# Patient Record
Sex: MALE | Race: WHITE | NOT HISPANIC OR LATINO | Employment: OTHER | URBAN - NONMETROPOLITAN AREA
[De-identification: names, ages, dates, MRNs, and addresses within clinical notes are randomized per-mention and may not be internally consistent; named-entity substitution may affect disease eponyms.]

---

## 2017-05-25 ENCOUNTER — APPOINTMENT (OUTPATIENT)
Dept: CT IMAGING | Facility: HOSPITAL | Age: 72
End: 2017-05-25

## 2017-05-25 ENCOUNTER — APPOINTMENT (OUTPATIENT)
Dept: GENERAL RADIOLOGY | Facility: HOSPITAL | Age: 72
End: 2017-05-25

## 2017-05-25 ENCOUNTER — HOSPITAL ENCOUNTER (INPATIENT)
Facility: HOSPITAL | Age: 72
LOS: 1 days | Discharge: HOME | End: 2017-05-27
Attending: EMERGENCY MEDICINE | Admitting: HOSPITALIST

## 2017-05-25 DIAGNOSIS — I25.10 CORONARY ARTERY DISEASE INVOLVING NATIVE CORONARY ARTERY, ANGINA PRESENCE UNSPECIFIED, UNSPECIFIED WHETHER NATIVE OR TRANSPLANTED HEART: ICD-10-CM

## 2017-05-25 DIAGNOSIS — I20.9 ISCHEMIC CHEST PAIN (HCC): Primary | ICD-10-CM

## 2017-05-25 DIAGNOSIS — I10 ESSENTIAL HYPERTENSION: ICD-10-CM

## 2017-05-25 LAB
ALBUMIN SERPL-MCNC: 4.1 G/DL (ref 3.4–4.8)
ALBUMIN/GLOB SERPL: 1.4 G/DL (ref 1.1–1.8)
ALP SERPL-CCNC: 77 U/L (ref 38–126)
ALT SERPL W P-5'-P-CCNC: 49 U/L (ref 21–72)
ANION GAP SERPL CALCULATED.3IONS-SCNC: 9 MMOL/L (ref 5–15)
APTT PPP: 26.1 SECONDS (ref 20–40.3)
AST SERPL-CCNC: 33 U/L (ref 17–59)
BASOPHILS # BLD AUTO: 0.01 10*3/MM3 (ref 0–0.2)
BASOPHILS NFR BLD AUTO: 0.1 % (ref 0–2)
BILIRUB SERPL-MCNC: 1.1 MG/DL (ref 0.2–1.3)
BUN BLD-MCNC: 15 MG/DL (ref 7–21)
BUN/CREAT SERPL: 20.5 (ref 7–25)
CALCIUM SPEC-SCNC: 8.7 MG/DL (ref 8.4–10.2)
CHLORIDE SERPL-SCNC: 102 MMOL/L (ref 95–110)
CK MB SERPL-CCNC: 1.83 NG/ML (ref 0–5)
CK SERPL-CCNC: 29 U/L (ref 55–170)
CO2 SERPL-SCNC: 27 MMOL/L (ref 22–31)
CREAT BLD-MCNC: 0.73 MG/DL (ref 0.7–1.3)
D-DIMER, QUANTITATIVE (MAD,POW, STR): 863 NG/ML (FEU) (ref 0–470)
DEPRECATED RDW RBC AUTO: 40.6 FL (ref 35.1–43.9)
EOSINOPHIL # BLD AUTO: 0.03 10*3/MM3 (ref 0–0.7)
EOSINOPHIL NFR BLD AUTO: 0.4 % (ref 0–7)
ERYTHROCYTE [DISTWIDTH] IN BLOOD BY AUTOMATED COUNT: 12.7 % (ref 11.5–14.5)
GFR SERPL CREATININE-BSD FRML MDRD: 105 ML/MIN/1.73 (ref 60–98)
GLOBULIN UR ELPH-MCNC: 3 GM/DL (ref 2.3–3.5)
GLUCOSE BLD-MCNC: 113 MG/DL (ref 60–100)
HCT VFR BLD AUTO: 45.2 % (ref 39–49)
HGB BLD-MCNC: 15.7 G/DL (ref 13.7–17.3)
HOLD SPECIMEN: NORMAL
HOLD SPECIMEN: NORMAL
IMM GRANULOCYTES # BLD: 0.02 10*3/MM3 (ref 0–0.02)
IMM GRANULOCYTES NFR BLD: 0.3 % (ref 0–0.5)
INR PPP: 0.99 (ref 0.8–1.2)
LARGE PLATELETS: NORMAL
LYMPHOCYTES # BLD AUTO: 0.33 10*3/MM3 (ref 0.6–4.2)
LYMPHOCYTES NFR BLD AUTO: 4.4 % (ref 10–50)
MAGNESIUM SERPL-MCNC: 1.5 MG/DL (ref 1.6–2.3)
MCH RBC QN AUTO: 30.3 PG (ref 26.5–34)
MCHC RBC AUTO-ENTMCNC: 34.7 G/DL (ref 31.5–36.3)
MCV RBC AUTO: 87.1 FL (ref 80–98)
MONOCYTES # BLD AUTO: 0.41 10*3/MM3 (ref 0–0.9)
MONOCYTES NFR BLD AUTO: 5.4 % (ref 0–12)
NEUTROPHILS # BLD AUTO: 6.78 10*3/MM3 (ref 2–8.6)
NEUTROPHILS NFR BLD AUTO: 89.4 % (ref 37–80)
NT-PROBNP SERPL-MCNC: 383 PG/ML (ref 0–900)
PLATELET # BLD AUTO: 92 10*3/MM3 (ref 150–450)
PMV BLD AUTO: 10 FL (ref 8–12)
POTASSIUM BLD-SCNC: 4.3 MMOL/L (ref 3.5–5.1)
PROT SERPL-MCNC: 7.1 G/DL (ref 6.3–8.6)
PROTHROMBIN TIME: 13 SECONDS (ref 11.1–15.3)
RBC # BLD AUTO: 5.19 10*6/MM3 (ref 4.37–5.74)
RBC MORPH BLD: NORMAL
SMALL PLATELETS BLD QL SMEAR: NORMAL
SODIUM BLD-SCNC: 138 MMOL/L (ref 137–145)
TROPONIN I SERPL-MCNC: 0.02 NG/ML
TROPONIN I SERPL-MCNC: <0.012 NG/ML
WBC MORPH BLD: NORMAL
WBC NRBC COR # BLD: 7.58 10*3/MM3 (ref 3.2–9.8)
WHOLE BLOOD HOLD SPECIMEN: NORMAL
WHOLE BLOOD HOLD SPECIMEN: NORMAL

## 2017-05-25 PROCEDURE — 99285 EMERGENCY DEPT VISIT HI MDM: CPT

## 2017-05-25 PROCEDURE — 85007 BL SMEAR W/DIFF WBC COUNT: CPT | Performed by: EMERGENCY MEDICINE

## 2017-05-25 PROCEDURE — G0378 HOSPITAL OBSERVATION PER HR: HCPCS

## 2017-05-25 PROCEDURE — 84484 ASSAY OF TROPONIN QUANT: CPT | Performed by: HOSPITALIST

## 2017-05-25 PROCEDURE — 25010000002 MORPHINE PER 10 MG: Performed by: EMERGENCY MEDICINE

## 2017-05-25 PROCEDURE — 82553 CREATINE MB FRACTION: CPT | Performed by: EMERGENCY MEDICINE

## 2017-05-25 PROCEDURE — 85379 FIBRIN DEGRADATION QUANT: CPT | Performed by: EMERGENCY MEDICINE

## 2017-05-25 PROCEDURE — 83735 ASSAY OF MAGNESIUM: CPT | Performed by: EMERGENCY MEDICINE

## 2017-05-25 PROCEDURE — 85730 THROMBOPLASTIN TIME PARTIAL: CPT | Performed by: EMERGENCY MEDICINE

## 2017-05-25 PROCEDURE — 85025 COMPLETE CBC W/AUTO DIFF WBC: CPT | Performed by: EMERGENCY MEDICINE

## 2017-05-25 PROCEDURE — 0 IOPAMIDOL PER 1 ML: Performed by: EMERGENCY MEDICINE

## 2017-05-25 PROCEDURE — 93005 ELECTROCARDIOGRAM TRACING: CPT | Performed by: EMERGENCY MEDICINE

## 2017-05-25 PROCEDURE — 85610 PROTHROMBIN TIME: CPT | Performed by: EMERGENCY MEDICINE

## 2017-05-25 PROCEDURE — 82550 ASSAY OF CK (CPK): CPT | Performed by: EMERGENCY MEDICINE

## 2017-05-25 PROCEDURE — 71275 CT ANGIOGRAPHY CHEST: CPT

## 2017-05-25 PROCEDURE — 84484 ASSAY OF TROPONIN QUANT: CPT | Performed by: EMERGENCY MEDICINE

## 2017-05-25 PROCEDURE — 25010000002 MORPHINE SULFATE (PF) 2 MG/ML SOLUTION: Performed by: EMERGENCY MEDICINE

## 2017-05-25 PROCEDURE — 80053 COMPREHEN METABOLIC PANEL: CPT | Performed by: EMERGENCY MEDICINE

## 2017-05-25 PROCEDURE — 93010 ELECTROCARDIOGRAM REPORT: CPT | Performed by: INTERNAL MEDICINE

## 2017-05-25 PROCEDURE — 83880 ASSAY OF NATRIURETIC PEPTIDE: CPT | Performed by: EMERGENCY MEDICINE

## 2017-05-25 PROCEDURE — 25010000002 ONDANSETRON PER 1 MG: Performed by: EMERGENCY MEDICINE

## 2017-05-25 PROCEDURE — 71010 HC CHEST PA OR AP: CPT

## 2017-05-25 RX ORDER — NITROGLYCERIN 20 MG/100ML
10-50 INJECTION INTRAVENOUS
Status: DISCONTINUED | OUTPATIENT
Start: 2017-05-25 | End: 2017-05-26 | Stop reason: CLARIF

## 2017-05-25 RX ORDER — MORPHINE SULFATE 2 MG/ML
2 INJECTION, SOLUTION INTRAMUSCULAR; INTRAVENOUS ONCE
Status: COMPLETED | OUTPATIENT
Start: 2017-05-25 | End: 2017-05-25

## 2017-05-25 RX ORDER — SODIUM CHLORIDE 9 MG/ML
75 INJECTION, SOLUTION INTRAVENOUS CONTINUOUS
Status: DISCONTINUED | OUTPATIENT
Start: 2017-05-25 | End: 2017-05-26

## 2017-05-25 RX ORDER — ONDANSETRON 2 MG/ML
4 INJECTION INTRAMUSCULAR; INTRAVENOUS ONCE
Status: COMPLETED | OUTPATIENT
Start: 2017-05-25 | End: 2017-05-25

## 2017-05-25 RX ORDER — NITROGLYCERIN 0.4 MG/1
0.4 TABLET SUBLINGUAL
Status: DISCONTINUED | OUTPATIENT
Start: 2017-05-25 | End: 2017-05-27 | Stop reason: HOSPADM

## 2017-05-25 RX ORDER — ASPIRIN 81 MG/1
324 TABLET, CHEWABLE ORAL ONCE
Status: DISCONTINUED | OUTPATIENT
Start: 2017-05-25 | End: 2017-05-25

## 2017-05-25 RX ORDER — MORPHINE SULFATE 4 MG/ML
4 INJECTION, SOLUTION INTRAMUSCULAR; INTRAVENOUS ONCE
Status: COMPLETED | OUTPATIENT
Start: 2017-05-25 | End: 2017-05-25

## 2017-05-25 RX ORDER — SODIUM CHLORIDE 0.9 % (FLUSH) 0.9 %
10 SYRINGE (ML) INJECTION AS NEEDED
Status: DISCONTINUED | OUTPATIENT
Start: 2017-05-25 | End: 2017-05-27 | Stop reason: HOSPADM

## 2017-05-25 RX ADMIN — IOPAMIDOL 57 ML: 755 INJECTION, SOLUTION INTRAVENOUS at 22:09

## 2017-05-25 RX ADMIN — MORPHINE SULFATE 2 MG: 2 INJECTION, SOLUTION INTRAMUSCULAR; INTRAVENOUS at 19:04

## 2017-05-25 RX ADMIN — ONDANSETRON 4 MG: 2 INJECTION INTRAMUSCULAR; INTRAVENOUS at 18:05

## 2017-05-25 RX ADMIN — SODIUM CHLORIDE 75 ML/HR: 900 INJECTION, SOLUTION INTRAVENOUS at 18:05

## 2017-05-25 RX ADMIN — NITROGLYCERIN 10 MCG/MIN: 20 INJECTION INTRAVENOUS at 19:14

## 2017-05-25 RX ADMIN — MORPHINE SULFATE 4 MG: 4 INJECTION, SOLUTION INTRAMUSCULAR; INTRAVENOUS at 18:05

## 2017-05-26 ENCOUNTER — APPOINTMENT (OUTPATIENT)
Dept: CARDIOLOGY | Facility: HOSPITAL | Age: 72
End: 2017-05-26
Attending: INTERNAL MEDICINE

## 2017-05-26 PROBLEM — E78.2 MIXED HYPERLIPIDEMIA: Status: ACTIVE | Noted: 2017-05-26

## 2017-05-26 PROBLEM — I25.118 CORONARY ARTERY DISEASE OF NATIVE ARTERY WITH STABLE ANGINA PECTORIS (HCC): Status: ACTIVE | Noted: 2017-05-26

## 2017-05-26 PROBLEM — I10 ESSENTIAL HYPERTENSION: Status: ACTIVE | Noted: 2017-05-26

## 2017-05-26 LAB
ALBUMIN SERPL-MCNC: 3.9 G/DL (ref 3.4–4.8)
ALBUMIN/GLOB SERPL: 1.3 G/DL (ref 1.1–1.8)
ALP SERPL-CCNC: 72 U/L (ref 38–126)
ALT SERPL W P-5'-P-CCNC: 51 U/L (ref 21–72)
ANION GAP SERPL CALCULATED.3IONS-SCNC: 12 MMOL/L (ref 5–15)
AST SERPL-CCNC: 30 U/L (ref 17–59)
BASOPHILS # BLD AUTO: 0.01 10*3/MM3 (ref 0–0.2)
BASOPHILS NFR BLD AUTO: 0.2 % (ref 0–2)
BILIRUB SERPL-MCNC: 1.2 MG/DL (ref 0.2–1.3)
BUN BLD-MCNC: 12 MG/DL (ref 7–21)
BUN/CREAT SERPL: 16.9 (ref 7–25)
CALCIUM SPEC-SCNC: 8.1 MG/DL (ref 8.4–10.2)
CHLORIDE SERPL-SCNC: 100 MMOL/L (ref 95–110)
CO2 SERPL-SCNC: 26 MMOL/L (ref 22–31)
CREAT BLD-MCNC: 0.71 MG/DL (ref 0.7–1.3)
DEPRECATED RDW RBC AUTO: 40.4 FL (ref 35.1–43.9)
EOSINOPHIL # BLD AUTO: 0.01 10*3/MM3 (ref 0–0.7)
EOSINOPHIL NFR BLD AUTO: 0.2 % (ref 0–7)
ERYTHROCYTE [DISTWIDTH] IN BLOOD BY AUTOMATED COUNT: 12.6 % (ref 11.5–14.5)
GFR SERPL CREATININE-BSD FRML MDRD: 109 ML/MIN/1.73 (ref 60–98)
GLOBULIN UR ELPH-MCNC: 2.9 GM/DL (ref 2.3–3.5)
GLUCOSE BLD-MCNC: 114 MG/DL (ref 60–100)
HCT VFR BLD AUTO: 44.7 % (ref 39–49)
HGB BLD-MCNC: 15.5 G/DL (ref 13.7–17.3)
IMM GRANULOCYTES # BLD: 0.02 10*3/MM3 (ref 0–0.02)
IMM GRANULOCYTES NFR BLD: 0.4 % (ref 0–0.5)
L PNEUMO1 AG UR QL IA: NEGATIVE
LYMPHOCYTES # BLD AUTO: 0.37 10*3/MM3 (ref 0.6–4.2)
LYMPHOCYTES NFR BLD AUTO: 7.6 % (ref 10–50)
MCH RBC QN AUTO: 30.2 PG (ref 26.5–34)
MCHC RBC AUTO-ENTMCNC: 34.7 G/DL (ref 31.5–36.3)
MCV RBC AUTO: 87 FL (ref 80–98)
MONOCYTES # BLD AUTO: 0.48 10*3/MM3 (ref 0–0.9)
MONOCYTES NFR BLD AUTO: 9.9 % (ref 0–12)
NEUTROPHILS # BLD AUTO: 3.96 10*3/MM3 (ref 2–8.6)
NEUTROPHILS NFR BLD AUTO: 81.7 % (ref 37–80)
PLATELET # BLD AUTO: 79 10*3/MM3 (ref 150–450)
PMV BLD AUTO: 10.4 FL (ref 8–12)
POTASSIUM BLD-SCNC: 3.6 MMOL/L (ref 3.5–5.1)
PROT SERPL-MCNC: 6.8 G/DL (ref 6.3–8.6)
RBC # BLD AUTO: 5.14 10*6/MM3 (ref 4.37–5.74)
S PNEUM AG SPEC QL LA: NEGATIVE
SODIUM BLD-SCNC: 138 MMOL/L (ref 137–145)
TROPONIN I SERPL-MCNC: 0.02 NG/ML
TROPONIN I SERPL-MCNC: 0.02 NG/ML
TROPONIN I SERPL-MCNC: 0.03 NG/ML
TROPONIN I SERPL-MCNC: 0.03 NG/ML
WBC NRBC COR # BLD: 4.85 10*3/MM3 (ref 3.2–9.8)
WHOLE BLOOD HOLD SPECIMEN: NORMAL

## 2017-05-26 PROCEDURE — 94799 UNLISTED PULMONARY SVC/PX: CPT

## 2017-05-26 PROCEDURE — 25010000002 CEFTRIAXONE: Performed by: HOSPITALIST

## 2017-05-26 PROCEDURE — 25010000002 ENOXAPARIN PER 10 MG: Performed by: HOSPITALIST

## 2017-05-26 PROCEDURE — 99232 SBSQ HOSP IP/OBS MODERATE 35: CPT | Performed by: NURSE PRACTITIONER

## 2017-05-26 PROCEDURE — 87040 BLOOD CULTURE FOR BACTERIA: CPT | Performed by: HOSPITALIST

## 2017-05-26 PROCEDURE — 84484 ASSAY OF TROPONIN QUANT: CPT | Performed by: HOSPITALIST

## 2017-05-26 PROCEDURE — 94640 AIRWAY INHALATION TREATMENT: CPT

## 2017-05-26 PROCEDURE — 25010000002 MAGNESIUM SULFATE IN D5W 1G/100ML (PREMIX) 1-5 GM/100ML-% SOLUTION: Performed by: HOSPITALIST

## 2017-05-26 PROCEDURE — 87899 AGENT NOS ASSAY W/OPTIC: CPT | Performed by: HOSPITALIST

## 2017-05-26 PROCEDURE — 87449 NOS EACH ORGANISM AG IA: CPT | Performed by: HOSPITALIST

## 2017-05-26 PROCEDURE — 85025 COMPLETE CBC W/AUTO DIFF WBC: CPT | Performed by: HOSPITALIST

## 2017-05-26 PROCEDURE — 25010000002 AZITHROMYCIN: Performed by: HOSPITALIST

## 2017-05-26 PROCEDURE — 80053 COMPREHEN METABOLIC PANEL: CPT | Performed by: HOSPITALIST

## 2017-05-26 PROCEDURE — 94760 N-INVAS EAR/PLS OXIMETRY 1: CPT

## 2017-05-26 RX ORDER — RAMIPRIL 5 MG/1
10 CAPSULE ORAL
Status: DISCONTINUED | OUTPATIENT
Start: 2017-05-26 | End: 2017-05-27 | Stop reason: HOSPADM

## 2017-05-26 RX ORDER — ASPIRIN 81 MG/1
81 TABLET ORAL DAILY
COMMUNITY

## 2017-05-26 RX ORDER — ISOSORBIDE MONONITRATE 60 MG/1
60 TABLET, EXTENDED RELEASE ORAL DAILY
COMMUNITY
End: 2017-05-27 | Stop reason: HOSPADM

## 2017-05-26 RX ORDER — ACETAMINOPHEN 325 MG/1
650 TABLET ORAL EVERY 4 HOURS PRN
Status: DISCONTINUED | OUTPATIENT
Start: 2017-05-26 | End: 2017-05-27 | Stop reason: HOSPADM

## 2017-05-26 RX ORDER — NITROGLYCERIN 20 MG/100ML
10-50 INJECTION INTRAVENOUS
Status: DISCONTINUED | OUTPATIENT
Start: 2017-05-26 | End: 2017-05-26

## 2017-05-26 RX ORDER — HYDROCHLOROTHIAZIDE 25 MG/1
25 TABLET ORAL DAILY
Status: DISCONTINUED | OUTPATIENT
Start: 2017-05-26 | End: 2017-05-27 | Stop reason: HOSPADM

## 2017-05-26 RX ORDER — ATORVASTATIN CALCIUM 80 MG/1
80 TABLET, FILM COATED ORAL DAILY
COMMUNITY

## 2017-05-26 RX ORDER — SODIUM CHLORIDE 0.9 % (FLUSH) 0.9 %
1-10 SYRINGE (ML) INJECTION AS NEEDED
Status: DISCONTINUED | OUTPATIENT
Start: 2017-05-26 | End: 2017-05-27 | Stop reason: HOSPADM

## 2017-05-26 RX ORDER — PRASUGREL 10 MG/1
10 TABLET, FILM COATED ORAL DAILY
COMMUNITY
End: 2021-05-09

## 2017-05-26 RX ORDER — GLYBURIDE 3 MG/1
3 TABLET ORAL 2 TIMES DAILY WITH MEALS
COMMUNITY
End: 2018-04-16 | Stop reason: HOSPADM

## 2017-05-26 RX ORDER — AMLODIPINE BESYLATE 5 MG/1
5 TABLET ORAL
COMMUNITY
Start: 2017-01-12 | End: 2018-01-13

## 2017-05-26 RX ORDER — ASPIRIN 81 MG/1
81 TABLET ORAL DAILY
Status: DISCONTINUED | OUTPATIENT
Start: 2017-05-26 | End: 2017-05-27 | Stop reason: HOSPADM

## 2017-05-26 RX ORDER — MORPHINE SULFATE 2 MG/ML
1 INJECTION, SOLUTION INTRAMUSCULAR; INTRAVENOUS EVERY 4 HOURS PRN
Status: DISCONTINUED | OUTPATIENT
Start: 2017-05-26 | End: 2017-05-27 | Stop reason: HOSPADM

## 2017-05-26 RX ORDER — NITROGLYCERIN 0.4 MG/1
0.4 TABLET SUBLINGUAL
COMMUNITY
Start: 2017-02-08 | End: 2018-02-04

## 2017-05-26 RX ORDER — CILOSTAZOL 100 MG/1
100 TABLET ORAL 2 TIMES DAILY
Status: DISCONTINUED | OUTPATIENT
Start: 2017-05-26 | End: 2017-05-27 | Stop reason: HOSPADM

## 2017-05-26 RX ORDER — PRASUGREL 10 MG/1
10 TABLET, FILM COATED ORAL DAILY
Status: DISCONTINUED | OUTPATIENT
Start: 2017-05-26 | End: 2017-05-27 | Stop reason: HOSPADM

## 2017-05-26 RX ORDER — HYDROCHLOROTHIAZIDE 25 MG/1
25 TABLET ORAL
COMMUNITY
Start: 2017-01-04 | End: 2017-12-31

## 2017-05-26 RX ORDER — IPRATROPIUM BROMIDE AND ALBUTEROL SULFATE 2.5; .5 MG/3ML; MG/3ML
3 SOLUTION RESPIRATORY (INHALATION)
Status: DISCONTINUED | OUTPATIENT
Start: 2017-05-26 | End: 2017-05-27 | Stop reason: HOSPADM

## 2017-05-26 RX ORDER — ALBUTEROL SULFATE 2.5 MG/3ML
2.5 SOLUTION RESPIRATORY (INHALATION) EVERY 4 HOURS PRN
Status: DISCONTINUED | OUTPATIENT
Start: 2017-05-26 | End: 2017-05-27 | Stop reason: HOSPADM

## 2017-05-26 RX ORDER — NALOXONE HCL 0.4 MG/ML
0.4 VIAL (ML) INJECTION
Status: DISCONTINUED | OUTPATIENT
Start: 2017-05-26 | End: 2017-05-27 | Stop reason: HOSPADM

## 2017-05-26 RX ORDER — MAGNESIUM SULFATE 1 G/100ML
1 INJECTION INTRAVENOUS ONCE
Status: COMPLETED | OUTPATIENT
Start: 2017-05-26 | End: 2017-05-26

## 2017-05-26 RX ORDER — RAMIPRIL 10 MG/1
10 CAPSULE ORAL DAILY
COMMUNITY
End: 2018-04-16 | Stop reason: HOSPADM

## 2017-05-26 RX ORDER — ISOSORBIDE MONONITRATE 60 MG/1
60 TABLET, EXTENDED RELEASE ORAL
Status: DISCONTINUED | OUTPATIENT
Start: 2017-05-26 | End: 2017-05-27

## 2017-05-26 RX ORDER — AMLODIPINE BESYLATE 5 MG/1
5 TABLET ORAL
Status: DISCONTINUED | OUTPATIENT
Start: 2017-05-26 | End: 2017-05-27 | Stop reason: HOSPADM

## 2017-05-26 RX ORDER — ATORVASTATIN CALCIUM 40 MG/1
80 TABLET, FILM COATED ORAL NIGHTLY
Status: DISCONTINUED | OUTPATIENT
Start: 2017-05-26 | End: 2017-05-27 | Stop reason: HOSPADM

## 2017-05-26 RX ORDER — HYDROCODONE BITARTRATE AND ACETAMINOPHEN 10; 325 MG/1; MG/1
1 TABLET ORAL EVERY 6 HOURS
COMMUNITY

## 2017-05-26 RX ADMIN — ENOXAPARIN SODIUM 40 MG: 40 INJECTION SUBCUTANEOUS at 09:00

## 2017-05-26 RX ADMIN — IPRATROPIUM BROMIDE AND ALBUTEROL SULFATE 3 ML: 2.5; .5 SOLUTION RESPIRATORY (INHALATION) at 14:45

## 2017-05-26 RX ADMIN — IPRATROPIUM BROMIDE AND ALBUTEROL SULFATE 3 ML: 2.5; .5 SOLUTION RESPIRATORY (INHALATION) at 10:53

## 2017-05-26 RX ADMIN — IPRATROPIUM BROMIDE AND ALBUTEROL SULFATE 3 ML: 2.5; .5 SOLUTION RESPIRATORY (INHALATION) at 07:12

## 2017-05-26 RX ADMIN — MAGNESIUM SULFATE HEPTAHYDRATE 1 G: 1 INJECTION, SOLUTION INTRAVENOUS at 05:42

## 2017-05-26 RX ADMIN — CILOSTAZOL 100 MG: 100 TABLET ORAL at 18:00

## 2017-05-26 RX ADMIN — NITROGLYCERIN 20 MCG/MIN: 20 INJECTION INTRAVENOUS at 00:41

## 2017-05-26 RX ADMIN — ATORVASTATIN CALCIUM 80 MG: 40 TABLET, FILM COATED ORAL at 21:23

## 2017-05-26 RX ADMIN — CEFTRIAXONE 1 G: 1 INJECTION, POWDER, FOR SOLUTION INTRAMUSCULAR; INTRAVENOUS at 04:08

## 2017-05-26 RX ADMIN — AZITHROMYCIN 500 MG: 500 INJECTION, POWDER, LYOPHILIZED, FOR SOLUTION INTRAVENOUS at 04:38

## 2017-05-26 RX ADMIN — ACETAMINOPHEN 650 MG: 325 TABLET ORAL at 04:34

## 2017-05-26 RX ADMIN — CILOSTAZOL 100 MG: 100 TABLET ORAL at 11:54

## 2017-05-26 RX ADMIN — IPRATROPIUM BROMIDE AND ALBUTEROL SULFATE 3 ML: 2.5; .5 SOLUTION RESPIRATORY (INHALATION) at 04:01

## 2017-05-26 RX ADMIN — AMLODIPINE BESYLATE 5 MG: 5 TABLET ORAL at 12:22

## 2017-05-26 RX ADMIN — RAMIPRIL 10 MG: 5 CAPSULE ORAL at 21:23

## 2017-05-26 RX ADMIN — ISOSORBIDE MONONITRATE 60 MG: 60 TABLET, EXTENDED RELEASE ORAL at 10:38

## 2017-05-26 RX ADMIN — ASPIRIN 81 MG: 81 TABLET, COATED ORAL at 11:54

## 2017-05-26 RX ADMIN — PRASUGREL HYDROCHLORIDE 10 MG: 10 TABLET, FILM COATED ORAL at 11:54

## 2017-05-26 RX ADMIN — HYDROCHLOROTHIAZIDE 25 MG: 25 TABLET ORAL at 12:22

## 2017-05-26 RX ADMIN — SODIUM CHLORIDE 75 ML/HR: 900 INJECTION, SOLUTION INTRAVENOUS at 00:00

## 2017-05-27 VITALS
SYSTOLIC BLOOD PRESSURE: 119 MMHG | BODY MASS INDEX: 27.95 KG/M2 | RESPIRATION RATE: 18 BRPM | DIASTOLIC BLOOD PRESSURE: 86 MMHG | OXYGEN SATURATION: 99 % | HEART RATE: 93 BPM | WEIGHT: 184.4 LBS | TEMPERATURE: 97.1 F | HEIGHT: 68 IN

## 2017-05-27 PROCEDURE — 25010000002 AZITHROMYCIN: Performed by: HOSPITALIST

## 2017-05-27 PROCEDURE — 25010000002 ENOXAPARIN PER 10 MG: Performed by: HOSPITALIST

## 2017-05-27 PROCEDURE — 25010000002 CEFTRIAXONE: Performed by: HOSPITALIST

## 2017-05-27 PROCEDURE — 99232 SBSQ HOSP IP/OBS MODERATE 35: CPT | Performed by: INTERNAL MEDICINE

## 2017-05-27 RX ORDER — RANOLAZINE 500 MG/1
1000 TABLET, EXTENDED RELEASE ORAL EVERY 12 HOURS SCHEDULED
Status: DISCONTINUED | OUTPATIENT
Start: 2017-05-27 | End: 2017-05-27 | Stop reason: HOSPADM

## 2017-05-27 RX ORDER — LEVOFLOXACIN 500 MG/1
500 TABLET, FILM COATED ORAL DAILY
Qty: 5 TABLET | Refills: 0 | Status: SHIPPED | OUTPATIENT
Start: 2017-05-27 | End: 2018-04-16 | Stop reason: HOSPADM

## 2017-05-27 RX ORDER — ISOSORBIDE MONONITRATE 60 MG/1
120 TABLET, EXTENDED RELEASE ORAL DAILY
Qty: 30 TABLET | Refills: 3 | Status: CANCELLED | OUTPATIENT
Start: 2017-05-27

## 2017-05-27 RX ORDER — METOPROLOL SUCCINATE 25 MG/1
25 TABLET, EXTENDED RELEASE ORAL
Status: DISCONTINUED | OUTPATIENT
Start: 2017-05-27 | End: 2017-05-27 | Stop reason: HOSPADM

## 2017-05-27 RX ORDER — RANOLAZINE 1000 MG/1
1000 TABLET, EXTENDED RELEASE ORAL EVERY 12 HOURS SCHEDULED
Qty: 60 TABLET | Refills: 3 | Status: SHIPPED | OUTPATIENT
Start: 2017-05-27 | End: 2018-04-16 | Stop reason: HOSPADM

## 2017-05-27 RX ORDER — METOPROLOL SUCCINATE 25 MG/1
25 TABLET, EXTENDED RELEASE ORAL DAILY
Qty: 30 TABLET | Refills: 3 | Status: CANCELLED | OUTPATIENT
Start: 2017-05-27

## 2017-05-27 RX ORDER — ISOSORBIDE MONONITRATE 60 MG/1
120 TABLET, EXTENDED RELEASE ORAL
Status: DISCONTINUED | OUTPATIENT
Start: 2017-05-27 | End: 2017-05-27 | Stop reason: HOSPADM

## 2017-05-27 RX ORDER — METOPROLOL SUCCINATE 25 MG/1
25 TABLET, EXTENDED RELEASE ORAL
Qty: 30 TABLET | Refills: 3 | Status: SHIPPED | OUTPATIENT
Start: 2017-05-27

## 2017-05-27 RX ORDER — ISOSORBIDE MONONITRATE 120 MG/1
120 TABLET, EXTENDED RELEASE ORAL
Qty: 30 TABLET | Refills: 3 | Status: SHIPPED | OUTPATIENT
Start: 2017-05-27

## 2017-05-27 RX ADMIN — ASPIRIN 81 MG: 81 TABLET, COATED ORAL at 08:22

## 2017-05-27 RX ADMIN — AMLODIPINE BESYLATE 5 MG: 5 TABLET ORAL at 08:22

## 2017-05-27 RX ADMIN — RANOLAZINE 1000 MG: 500 TABLET, FILM COATED, EXTENDED RELEASE ORAL at 09:15

## 2017-05-27 RX ADMIN — HYDROCHLOROTHIAZIDE 25 MG: 25 TABLET ORAL at 08:22

## 2017-05-27 RX ADMIN — CEFTRIAXONE 1 G: 1 INJECTION, POWDER, FOR SOLUTION INTRAMUSCULAR; INTRAVENOUS at 04:25

## 2017-05-27 RX ADMIN — METOPROLOL SUCCINATE 25 MG: 25 TABLET, EXTENDED RELEASE ORAL at 09:30

## 2017-05-27 RX ADMIN — ISOSORBIDE MONONITRATE 60 MG: 60 TABLET, EXTENDED RELEASE ORAL at 08:22

## 2017-05-27 RX ADMIN — CILOSTAZOL 100 MG: 100 TABLET ORAL at 08:22

## 2017-05-27 RX ADMIN — ENOXAPARIN SODIUM 40 MG: 40 INJECTION SUBCUTANEOUS at 08:23

## 2017-05-27 RX ADMIN — PRASUGREL HYDROCHLORIDE 10 MG: 10 TABLET, FILM COATED ORAL at 08:22

## 2017-05-27 RX ADMIN — AZITHROMYCIN 500 MG: 500 INJECTION, POWDER, LYOPHILIZED, FOR SOLUTION INTRAVENOUS at 05:56

## 2017-05-31 LAB
BACTERIA SPEC AEROBE CULT: NORMAL
BACTERIA SPEC AEROBE CULT: NORMAL

## 2017-06-12 NOTE — DISCHARGE SUMMARY
"    [facility]  DISCHARGE SUMMARY       Date of Admission: 5/25/2017  Date of Discharge:  6/11/2017  Primary Care Physician: Aydin Michael MD    Presenting Problem/History of Present Illness:  Ischemic chest pain [I20.9]  Ischemic chest pain [I20.9]       Final Discharge Diagnoses:  Hospital Problem List     Ischemic chest pain    Coronary artery disease of native artery with stable angina pectoris    Essential hypertension    Mixed hyperlipidemia          Consults:   Consults     Date and Time Order Name Status Description    5/26/2017 0837 Inpatient Consult to Cardiology Completed           Procedures Performed:                 Pertinent Test Results: none     Chief Complaint on Day of Discharge: No chest pain no shortness of breath    Hospital Course:  The patient is a 72 y.o. male who presented to [facility] with , chest pain and initially it was thought that the patient does have acute coronary syndrome patient has been evaluated by cardiology no evidence of acute coronary syndrome patient has been seen by Dr. Mays in all spiral and did have stents by him currently at the day of discharge no evidence of any significant chest pain or shortness of breath and patient wants to go home patient also has been treated for pneumonia with IV antibiotics with significant improvement in the symptoms  Agent remained remained hemodynamically stable during this hospital stay.      Condition on Discharge:  sTable    Physical Exam on Discharge:  /86 (BP Location: Left arm)  Pulse 93  Temp 97.1 °F (36.2 °C) (Oral)   Resp 18  Ht 68\" (172.7 cm)  Wt 184 lb 6.4 oz (83.6 kg)  SpO2 99%  BMI 28.04 kg/m2  Physical Exam  Heart S1-S2 regular rate and rhythm  Chest decreased breath sounds bilaterally  Abdomen soft and nontender  Extremities no  tenderness    Discharge Disposition:  Home or Self Care    Discharge Medications:   Jake Santos "   Home Medication Instructions [facility]:[id number]    Printed on:06/11/17 1934   Medication Information                      amLODIPine (NORVASC) 5 MG tablet  Take 5 mg by mouth.             aspirin 81 MG EC tablet  Take 81 mg by mouth Daily.             atorvastatin (LIPITOR) 80 MG tablet  Take 80 mg by mouth Daily.             glyBURIDE micronized (GLYNASE) 3 MG tablet  Take 3 mg by mouth 3 (Three) Times a Day With Meals.             hydrochlorothiazide (HYDRODIURIL) 25 MG tablet  Take 25 mg by mouth.             HYDROcodone-acetaminophen (NORCO)  MG per tablet  Take 1 tablet by mouth Every 6 (Six) Hours.             isosorbide mononitrate (IMDUR) 120 MG 24 hr tablet  Take 1 tablet by mouth Daily.             levoFLOXacin (LEVAQUIN) 500 MG tablet  Take 1 tablet by mouth Daily.             metFORMIN (GLUCOPHAGE) 1000 MG tablet  Take 1,000 mg by mouth 2 (Two) Times a Day.             metoprolol succinate XL (TOPROL-XL) 25 MG 24 hr tablet  Take 1 tablet by mouth Daily.             nitroglycerin (NITROSTAT) 0.4 MG SL tablet  Place 0.4 mg under the tongue Every 5 (Five) Minutes.             Omega-3 Fatty Acids (FISH OIL PO)  Take 1 capsule by mouth.             prasugrel (EFFIENT) 10 MG tablet  Take 10 mg by mouth.             ramipril (ALTACE) 10 MG capsule  Take 10 mg by mouth Daily.             ranolazine (RANEXA) 1000 MG 12 hr tablet  Take 1 tablet by mouth Every 12 (Twelve) Hours.             UNKNOWN TO PATIENT  Pt is on home meds, but doesn't know what they are. Pharmacy closed.                 Discharge Diet:     Activity at Discharge:     Discharge Care Plan/Instructions: Pneumonia to continue Levaquin  Carotid artery disease no evidence of acute coronary syndrome at this time but since the patient did have extensive history and stents patient needs to avoid  Avoid excessive exertion until seen by his CARDIOLOGIST  Uncontrolled hypertension currently better controlled seeadjustment in the  medications  Follow-up Appointments:   No future appointments.    Test Results Pending at Discharge:

## 2018-03-21 ENCOUNTER — OFFICE VISIT (OUTPATIENT)
Dept: OTOLARYNGOLOGY | Facility: CLINIC | Age: 73
End: 2018-03-21

## 2018-03-21 VITALS — WEIGHT: 195.6 LBS | HEIGHT: 68 IN | BODY MASS INDEX: 29.64 KG/M2 | TEMPERATURE: 98.3 F

## 2018-03-21 DIAGNOSIS — C44.99 BASOSQUAMOUS CARCINOMA OF SKIN: Primary | ICD-10-CM

## 2018-03-21 PROCEDURE — 99203 OFFICE O/P NEW LOW 30 MIN: CPT | Performed by: OTOLARYNGOLOGY

## 2018-03-21 RX ORDER — GLIPIZIDE 5 MG/1
5 TABLET ORAL
COMMUNITY
End: 2018-04-16 | Stop reason: HOSPADM

## 2018-03-21 RX ORDER — NITROGLYCERIN 0.4 MG/1
0.4 TABLET SUBLINGUAL
COMMUNITY

## 2018-03-21 RX ORDER — AMLODIPINE BESYLATE 5 MG/1
5 TABLET ORAL DAILY
COMMUNITY

## 2018-03-21 RX ORDER — CILOSTAZOL 100 MG/1
100 TABLET ORAL 2 TIMES DAILY
COMMUNITY

## 2018-03-22 ENCOUNTER — PREP FOR SURGERY (OUTPATIENT)
Dept: OTHER | Facility: HOSPITAL | Age: 73
End: 2018-03-22

## 2018-03-22 PROBLEM — C44.99 BASOSQUAMOUS CARCINOMA OF SKIN: Status: ACTIVE | Noted: 2018-03-22

## 2018-03-22 NOTE — PROGRESS NOTES
Subjective   Jake Santos is a 73 y.o. male.   This is a consultation from Dr. 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  Also states he had a skin cancer resected with a skin graft applied to his nose some time after that (he cannot recall when it was done and thinks it was done in [place]).  Had a lesion of his nasal tip that had been present for a few months.  Underwent shave biopsy by Dr. Fish Bennett in October 2017.  This showed basal squamous carcinoma.  Was initially referred to Dr. Abdirizak Benavidez in [place], but apparently surgery was not done due to the fact that he had recently had a cardiac stent placed and could not be taken off his anticoagulants.  Patient then requested a referral to another center.  He is now 13 months post stent and will be able to go off his Effient if needed.      The following portions of the patient's history were reviewed and updated as appropriate: allergies, current medications, past family history, past medical history, past social history, past surgical history and problem list.      Jake Santos reports that he has quit smoking. He has never used smokeless tobacco. He reports that he drinks alcohol. He reports that he does not use drugs.  Patient is not a tobacco user and has not been counseled for use of tobacco products    No family history on file.    Allergies   Allergen Reactions   • Clopidogrel Other (See Comments)     Confusion         Current Outpatient Prescriptions:   •  amLODIPine (NORVASC) 5 MG tablet, Take 5 mg by mouth Daily., Disp: , Rfl:   •  aspirin 81 MG EC tablet, Take 81 mg by mouth Daily., Disp: , Rfl:   •  atorvastatin (LIPITOR) 80 MG tablet, Take 80 mg by mouth Daily., Disp: , Rfl:   •  cilostazol (PLETAL) 100 MG tablet, Take 100 mg by mouth 2 (Two) Times a Day., Disp: , Rfl:   •  glipiZIDE (GLUCOTROL) 5 MG tablet, Take 5 mg by  mouth 2 (Two) Times a Day Before Meals., Disp: , Rfl:   •  glyBURIDE micronized (GLYNASE) 3 MG tablet, Take 3 mg by mouth 3 (Three) Times a Day With Meals., Disp: , Rfl:   •  HYDROcodone-acetaminophen (NORCO)  MG per tablet, Take 1 tablet by mouth Every 6 (Six) Hours., Disp: , Rfl:   •  isosorbide mononitrate (IMDUR) 120 MG 24 hr tablet, Take 1 tablet by mouth Daily., Disp: 30 tablet, Rfl: 3  •  metFORMIN (GLUCOPHAGE) 1000 MG tablet, Take 1,000 mg by mouth 2 (Two) Times a Day., Disp: , Rfl:   •  metoprolol succinate XL (TOPROL-XL) 25 MG 24 hr tablet, Take 1 tablet by mouth Daily., Disp: 30 tablet, Rfl: 3  •  nitroglycerin (NITROSTAT) 0.4 MG SL tablet, Place 0.4 mg under the tongue Every 5 (Five) Minutes As Needed for Chest Pain. Take no more than 3 doses in 15 minutes., Disp: , Rfl:   •  Omega-3 Fatty Acids (FISH OIL PO), Take 1 capsule by mouth., Disp: , Rfl:   •  prasugrel (EFFIENT) 10 MG tablet, Take 10 mg by mouth., Disp: , Rfl:   •  ramipril (ALTACE) 10 MG capsule, Take 10 mg by mouth Daily., Disp: , Rfl:   •  levoFLOXacin (LEVAQUIN) 500 MG tablet, Take 1 tablet by mouth Daily., Disp: 5 tablet, Rfl: 0  •  ranolazine (RANEXA) 1000 MG 12 hr tablet, Take 1 tablet by mouth Every 12 (Twelve) Hours., Disp: 60 tablet, Rfl: 3  •  UNKNOWN TO PATIENT, Pt is on home meds, but doesn't know what they are. Pharmacy closed., Disp: , Rfl:     Past Medical History:   Diagnosis Date   • Diabetes    • Skin cancer          Review of Systems   Respiratory: Negative for shortness of breath.    Cardiovascular: Negative for chest pain.   All other systems reviewed and are negative.          Objective   Physical Exam  General: Well-developed well-nourished male in no acute distress.  Alert and oriented ×3. Head: Normocephalic. Face: Symmetrical strength and appearance. PERRL. EOMI. Voice:Strong. Speech:Fluent  Ears: External ears no deformity, canals no discharge, tympanic membranes intact clear and mobile bilaterally.  Nose:  Nares show no discharge mass polyp or purulence.  Boggy mucosa is present.  No gross external deformity.  Septum: Midline  Oral cavity: Lips and gums without lesions.  Tongue and floor of mouth without lesions.  Parotid and submandibular ducts unobstructed.  No mucosal lesions on the buccal mucosa or vestibule of the mouth.  Pharynx: No erythema exudate mass or ulcer.  Mirror exam is negative  Neck: No lymphadenopathy.  No thyromegaly.  Trachea and larynx midline.  No masses in the parotid or submandibular glands.  Skin: 0.9 x 0.7 cm sclerotic lesion of the left nasal tip consistent with the biopsy site indicated in Dr. Bennett's notes which are personally reviewed.  Chest: Clear.  Heart: Regular.  Abdomen: Benign.    Assessment/Plan   Jake was seen today for skin lesion.    Diagnoses and all orders for this visit:    Basosquamous carcinoma of skin      Plan:I have offered to perform excision of the skin lesion(s) with frozen section margin control if indicated, and possible flap or skin graft if needed for closure.  I explained that in all likelihood a full-thickness skin graft will be needed, as I would not want to perform a flap procedure given that he is previously had both surgery and radiation to his nose and the vasculature is uncertain.  I have explained the nature of this procedure to the patient in layman's terms including risks of bleeding, infection, poor healing, numbness, recurrence, and possible need for further treatment depending on final pathology.  Proposed benefit would be definitive treatment of the identified lesions.  The alternative would be observation which could result in continued or recurrent growth of the lesions.  Patient voices understanding of all of the above and wishes to proceed.  This will be scheduled.  Per [facility] guidelines the patient's outside pathology slides will be obtained for review by in-house pathology prior to the procedure.    My thanks  to Dr. Bennett for this consultation

## 2018-03-28 DIAGNOSIS — C44.99 BASOSQUAMOUS CARCINOMA OF SKIN: Primary | ICD-10-CM

## 2018-03-28 PROCEDURE — 88321 CONSLTJ&REPRT SLD PREP ELSWR: CPT | Performed by: PATHOLOGY

## 2018-03-28 PROCEDURE — 88321 CONSLTJ&REPRT SLD PREP ELSWR: CPT | Performed by: OTOLARYNGOLOGY

## 2018-03-30 LAB
CYTO UR: NORMAL
LAB AP CASE REPORT: NORMAL
Lab: NORMAL
PATH REPORT.FINAL DX SPEC: NORMAL
PATH REPORT.GROSS SPEC: NORMAL

## 2018-04-13 ENCOUNTER — APPOINTMENT (OUTPATIENT)
Dept: PREADMISSION TESTING | Facility: HOSPITAL | Age: 73
End: 2018-04-13

## 2018-04-13 VITALS
DIASTOLIC BLOOD PRESSURE: 88 MMHG | HEIGHT: 68 IN | BODY MASS INDEX: 28.49 KG/M2 | WEIGHT: 188 LBS | OXYGEN SATURATION: 95 % | HEART RATE: 83 BPM | SYSTOLIC BLOOD PRESSURE: 148 MMHG | RESPIRATION RATE: 18 BRPM

## 2018-04-13 LAB
ANION GAP SERPL CALCULATED.3IONS-SCNC: 12 MMOL/L (ref 5–15)
BUN BLD-MCNC: 13 MG/DL (ref 7–21)
BUN/CREAT SERPL: 18.1 (ref 7–25)
CALCIUM SPEC-SCNC: 9.4 MG/DL (ref 8.4–10.2)
CHLORIDE SERPL-SCNC: 106 MMOL/L (ref 95–110)
CO2 SERPL-SCNC: 26 MMOL/L (ref 22–31)
CREAT BLD-MCNC: 0.72 MG/DL (ref 0.7–1.3)
GFR SERPL CREATININE-BSD FRML MDRD: 107 ML/MIN/1.73 (ref 60–98)
GLUCOSE BLD-MCNC: 127 MG/DL (ref 60–100)
POTASSIUM BLD-SCNC: 4 MMOL/L (ref 3.5–5.1)
SODIUM BLD-SCNC: 144 MMOL/L (ref 137–145)

## 2018-04-13 PROCEDURE — 80048 BASIC METABOLIC PNL TOTAL CA: CPT | Performed by: ANESTHESIOLOGY

## 2018-04-13 PROCEDURE — 36415 COLL VENOUS BLD VENIPUNCTURE: CPT

## 2018-04-13 PROCEDURE — 93010 ELECTROCARDIOGRAM REPORT: CPT | Performed by: INTERNAL MEDICINE

## 2018-04-13 PROCEDURE — 93005 ELECTROCARDIOGRAM TRACING: CPT

## 2018-04-13 RX ORDER — SODIUM CHLORIDE 9 MG/ML
1000 INJECTION, SOLUTION INTRAVENOUS CONTINUOUS PRN
Status: CANCELLED | OUTPATIENT
Start: 2018-04-16

## 2018-04-13 RX ORDER — RANOLAZINE 500 MG/1
500 TABLET, EXTENDED RELEASE ORAL EVERY 12 HOURS SCHEDULED
COMMUNITY
Start: 2016-11-14 | End: 2018-04-16 | Stop reason: HOSPADM

## 2018-04-13 RX ORDER — RAMIPRIL 5 MG/1
5 CAPSULE ORAL DAILY
COMMUNITY
End: 2021-05-09

## 2018-04-13 RX ORDER — GLIPIZIDE 5 MG/1
5 TABLET ORAL
COMMUNITY
Start: 2018-01-17 | End: 2021-05-09

## 2018-04-13 RX ORDER — GLIPIZIDE 5 MG/1
5 TABLET ORAL
COMMUNITY
Start: 2018-01-17 | End: 2018-04-16 | Stop reason: HOSPADM

## 2018-04-13 NOTE — DISCHARGE INSTRUCTIONS
[facility]  Pre-op Information and Guidelines    You will be called after 2 p.m. the day before your surgery (Friday for Monday surgery) and notified of your time for arrival and approximate surgery time.  If you have not received a call by 4P.M., please contact Same Day Surgery at [phone] of if outside [facility] call [phone].    Please Follow these Important Safety Guidelines:    • The morning of your procedure, take only the medications listed below with   A sip of water:_____________________________________________       ______________________________________________    • DO NOT eat or drink anything after 12:00 midnight the night before surgery  Specific instructions concerning drinking clear liquids will be discussed during  the pre-surgery instruction call the day before your surgery.    • If you take a blood thinner (ex. Plavix, Coumadin, aspirin), ask your doctor when to stop it before surgery  STOP DATE: _________________    • Only 2 visitors are allowed in patient rooms at a time  Your visitors will be asked to wait in the lobby until the admission process is complete with the exception of a parent with a child and patients in need of special assistance.    • YOU CANNOT DRIVE YOURSELF HOME  You must be accompanied by someone who will be responsible for driving you home after surgery and for your care at home.    • DO NOT chew gum, use breath mints, hard candy, or smoke the day of surgery  • DO NOT drink alcohol for at least 24 hours before your surgery  • DO NOT wear any jewelry and remove all body piercing before coming to the hospital  • DO NOT wear make-up to the hospital  • If you are having surgery on an extremity (arm/leg/foot) remove nail polish/artificial nails on the surgical side  • Clothing, glasses, contacts, dentures, and hairpieces must be removed before surgery  • Bathe the night before or the morning of your surgery and do not use powders/lotions on  skin.

## 2018-04-13 NOTE — PAT
Dr. Hawley here to review EKG and patient history no further orders or instructions at this time.  Spoke with Dr. Cartwright over the phone patient had related that he did not receive instructions on effient or asa, Dr. Cartwright related that he had instructed him to stop 3 days prior to surgery, informed Dr. Cartwright that patient had taken meds to today.  Dr. Cartwright instructed to have patient stop effient and asa today, patient informed and understands instructions.

## 2018-04-15 ENCOUNTER — ANESTHESIA EVENT (OUTPATIENT)
Dept: PERIOP | Facility: HOSPITAL | Age: 73
End: 2018-04-15

## 2018-04-16 ENCOUNTER — ANESTHESIA (OUTPATIENT)
Dept: PERIOP | Facility: HOSPITAL | Age: 73
End: 2018-04-16

## 2018-04-16 ENCOUNTER — HOSPITAL ENCOUNTER (OUTPATIENT)
Facility: HOSPITAL | Age: 73
Setting detail: HOSPITAL OUTPATIENT SURGERY
Discharge: HOME | End: 2018-04-16
Attending: OTOLARYNGOLOGY | Admitting: OTOLARYNGOLOGY

## 2018-04-16 VITALS
TEMPERATURE: 98 F | BODY MASS INDEX: 28.53 KG/M2 | SYSTOLIC BLOOD PRESSURE: 144 MMHG | HEIGHT: 68 IN | DIASTOLIC BLOOD PRESSURE: 72 MMHG | HEART RATE: 73 BPM | OXYGEN SATURATION: 96 % | WEIGHT: 188.27 LBS | RESPIRATION RATE: 18 BRPM

## 2018-04-16 DIAGNOSIS — C44.99 BASOSQUAMOUS CARCINOMA OF SKIN: ICD-10-CM

## 2018-04-16 LAB
GLUCOSE BLDC GLUCOMTR-MCNC: 131 MG/DL (ref 70–130)
GLUCOSE BLDC GLUCOMTR-MCNC: 132 MG/DL (ref 70–130)

## 2018-04-16 PROCEDURE — 25010000002 NEOSTIGMINE PER 0.5 MG: Performed by: NURSE ANESTHETIST, CERTIFIED REGISTERED

## 2018-04-16 PROCEDURE — 88305 TISSUE EXAM BY PATHOLOGIST: CPT | Performed by: OTOLARYNGOLOGY

## 2018-04-16 PROCEDURE — 11642 EXC F/E/E/N/L MAL+MRG 1.1-2: CPT | Performed by: OTOLARYNGOLOGY

## 2018-04-16 PROCEDURE — 25010000003 CEFAZOLIN PER 500 MG: Performed by: OTOLARYNGOLOGY

## 2018-04-16 PROCEDURE — 82962 GLUCOSE BLOOD TEST: CPT

## 2018-04-16 PROCEDURE — 25010000002 MIDAZOLAM PER 1 MG: Performed by: NURSE ANESTHETIST, CERTIFIED REGISTERED

## 2018-04-16 PROCEDURE — 25010000002 PROPOFOL 10 MG/ML EMULSION: Performed by: NURSE ANESTHETIST, CERTIFIED REGISTERED

## 2018-04-16 PROCEDURE — 15260 FTH/GFT FR N/E/E/L 20 SQCM/<: CPT | Performed by: OTOLARYNGOLOGY

## 2018-04-16 PROCEDURE — 25010000002 PHENYLEPHRINE PER 1 ML: Performed by: NURSE ANESTHETIST, CERTIFIED REGISTERED

## 2018-04-16 PROCEDURE — 25010000002 ONDANSETRON PER 1 MG: Performed by: NURSE ANESTHETIST, CERTIFIED REGISTERED

## 2018-04-16 PROCEDURE — 25010000002 DEXAMETHASONE PER 1 MG: Performed by: NURSE ANESTHETIST, CERTIFIED REGISTERED

## 2018-04-16 PROCEDURE — 88331 PATH CONSLTJ SURG 1 BLK 1SPC: CPT | Performed by: PATHOLOGY

## 2018-04-16 PROCEDURE — 88331 PATH CONSLTJ SURG 1 BLK 1SPC: CPT | Performed by: OTOLARYNGOLOGY

## 2018-04-16 PROCEDURE — 25010000002 FENTANYL CITRATE (PF) 100 MCG/2ML SOLUTION: Performed by: NURSE ANESTHETIST, CERTIFIED REGISTERED

## 2018-04-16 PROCEDURE — 88305 TISSUE EXAM BY PATHOLOGIST: CPT | Performed by: PATHOLOGY

## 2018-04-16 RX ORDER — GLYCOPYRROLATE 0.2 MG/ML
INJECTION INTRAMUSCULAR; INTRAVENOUS AS NEEDED
Status: DISCONTINUED | OUTPATIENT
Start: 2018-04-16 | End: 2018-04-16 | Stop reason: SURG

## 2018-04-16 RX ORDER — LIDOCAINE HYDROCHLORIDE 20 MG/ML
INJECTION, SOLUTION INFILTRATION; PERINEURAL AS NEEDED
Status: DISCONTINUED | OUTPATIENT
Start: 2018-04-16 | End: 2018-04-16 | Stop reason: SURG

## 2018-04-16 RX ORDER — LABETALOL HYDROCHLORIDE 5 MG/ML
5 INJECTION, SOLUTION INTRAVENOUS
Status: DISCONTINUED | OUTPATIENT
Start: 2018-04-16 | End: 2018-04-16 | Stop reason: HOSPADM

## 2018-04-16 RX ORDER — PROPOFOL 10 MG/ML
VIAL (ML) INTRAVENOUS AS NEEDED
Status: DISCONTINUED | OUTPATIENT
Start: 2018-04-16 | End: 2018-04-16 | Stop reason: SURG

## 2018-04-16 RX ORDER — ACETAMINOPHEN 650 MG/1
650 SUPPOSITORY RECTAL ONCE AS NEEDED
Status: DISCONTINUED | OUTPATIENT
Start: 2018-04-16 | End: 2018-04-16 | Stop reason: HOSPADM

## 2018-04-16 RX ORDER — ONDANSETRON 2 MG/ML
INJECTION INTRAMUSCULAR; INTRAVENOUS AS NEEDED
Status: DISCONTINUED | OUTPATIENT
Start: 2018-04-16 | End: 2018-04-16 | Stop reason: SURG

## 2018-04-16 RX ORDER — DIPHENHYDRAMINE HYDROCHLORIDE 50 MG/ML
12.5 INJECTION INTRAMUSCULAR; INTRAVENOUS
Status: DISCONTINUED | OUTPATIENT
Start: 2018-04-16 | End: 2018-04-16 | Stop reason: HOSPADM

## 2018-04-16 RX ORDER — EPHEDRINE SULFATE 50 MG/ML
INJECTION, SOLUTION INTRAVENOUS AS NEEDED
Status: DISCONTINUED | OUTPATIENT
Start: 2018-04-16 | End: 2018-04-16 | Stop reason: SURG

## 2018-04-16 RX ORDER — FLUMAZENIL 0.1 MG/ML
0.2 INJECTION INTRAVENOUS AS NEEDED
Status: DISCONTINUED | OUTPATIENT
Start: 2018-04-16 | End: 2018-04-16 | Stop reason: HOSPADM

## 2018-04-16 RX ORDER — DEXAMETHASONE SODIUM PHOSPHATE 4 MG/ML
INJECTION, SOLUTION INTRA-ARTICULAR; INTRALESIONAL; INTRAMUSCULAR; INTRAVENOUS; SOFT TISSUE AS NEEDED
Status: DISCONTINUED | OUTPATIENT
Start: 2018-04-16 | End: 2018-04-16 | Stop reason: SURG

## 2018-04-16 RX ORDER — ROCURONIUM BROMIDE 10 MG/ML
INJECTION, SOLUTION INTRAVENOUS AS NEEDED
Status: DISCONTINUED | OUTPATIENT
Start: 2018-04-16 | End: 2018-04-16 | Stop reason: SURG

## 2018-04-16 RX ORDER — MEPERIDINE HYDROCHLORIDE 50 MG/ML
12.5 INJECTION INTRAMUSCULAR; INTRAVENOUS; SUBCUTANEOUS
Status: DISCONTINUED | OUTPATIENT
Start: 2018-04-16 | End: 2018-04-16 | Stop reason: HOSPADM

## 2018-04-16 RX ORDER — HYDROCODONE BITARTRATE AND ACETAMINOPHEN 5; 325 MG/1; MG/1
1-2 TABLET ORAL EVERY 4 HOURS PRN
Qty: 24 TABLET | Refills: 0 | Status: SHIPPED | OUTPATIENT
Start: 2018-04-16 | End: 2021-05-09

## 2018-04-16 RX ORDER — CEPHALEXIN 500 MG/1
500 CAPSULE ORAL 3 TIMES DAILY
Qty: 21 CAPSULE | Refills: 0 | Status: SHIPPED | OUTPATIENT
Start: 2018-04-16 | End: 2021-05-09

## 2018-04-16 RX ORDER — ONDANSETRON 2 MG/ML
4 INJECTION INTRAMUSCULAR; INTRAVENOUS ONCE AS NEEDED
Status: DISCONTINUED | OUTPATIENT
Start: 2018-04-16 | End: 2018-04-16 | Stop reason: HOSPADM

## 2018-04-16 RX ORDER — HYDROCODONE BITARTRATE AND ACETAMINOPHEN 5; 325 MG/1; MG/1
1 TABLET ORAL ONCE AS NEEDED
Status: DISCONTINUED | OUTPATIENT
Start: 2018-04-16 | End: 2018-04-16 | Stop reason: HOSPADM

## 2018-04-16 RX ORDER — SODIUM CHLORIDE 9 MG/ML
1000 INJECTION, SOLUTION INTRAVENOUS CONTINUOUS PRN
Status: DISCONTINUED | OUTPATIENT
Start: 2018-04-16 | End: 2018-04-16 | Stop reason: HOSPADM

## 2018-04-16 RX ORDER — MIDAZOLAM HYDROCHLORIDE 1 MG/ML
INJECTION INTRAMUSCULAR; INTRAVENOUS AS NEEDED
Status: DISCONTINUED | OUTPATIENT
Start: 2018-04-16 | End: 2018-04-16 | Stop reason: SURG

## 2018-04-16 RX ORDER — EPHEDRINE SULFATE 50 MG/ML
5 INJECTION, SOLUTION INTRAVENOUS ONCE AS NEEDED
Status: DISCONTINUED | OUTPATIENT
Start: 2018-04-16 | End: 2018-04-16 | Stop reason: HOSPADM

## 2018-04-16 RX ORDER — LIDOCAINE HYDROCHLORIDE AND EPINEPHRINE 10; 10 MG/ML; UG/ML
INJECTION, SOLUTION INFILTRATION; PERINEURAL AS NEEDED
Status: DISCONTINUED | OUTPATIENT
Start: 2018-04-16 | End: 2018-04-16 | Stop reason: HOSPADM

## 2018-04-16 RX ORDER — NALOXONE HCL 0.4 MG/ML
0.2 VIAL (ML) INJECTION AS NEEDED
Status: DISCONTINUED | OUTPATIENT
Start: 2018-04-16 | End: 2018-04-16 | Stop reason: HOSPADM

## 2018-04-16 RX ORDER — ACETAMINOPHEN 325 MG/1
650 TABLET ORAL ONCE AS NEEDED
Status: DISCONTINUED | OUTPATIENT
Start: 2018-04-16 | End: 2018-04-16 | Stop reason: HOSPADM

## 2018-04-16 RX ORDER — FENTANYL CITRATE 50 UG/ML
INJECTION, SOLUTION INTRAMUSCULAR; INTRAVENOUS AS NEEDED
Status: DISCONTINUED | OUTPATIENT
Start: 2018-04-16 | End: 2018-04-16 | Stop reason: SURG

## 2018-04-16 RX ADMIN — MIDAZOLAM 2 MG: 1 INJECTION INTRAMUSCULAR; INTRAVENOUS at 12:57

## 2018-04-16 RX ADMIN — ROCURONIUM BROMIDE 50 MG: 10 INJECTION INTRAVENOUS at 13:06

## 2018-04-16 RX ADMIN — FENTANYL CITRATE 50 MCG: 50 INJECTION, SOLUTION INTRAMUSCULAR; INTRAVENOUS at 13:06

## 2018-04-16 RX ADMIN — EPHEDRINE SULFATE 10 MG: 50 INJECTION INTRAVENOUS at 13:56

## 2018-04-16 RX ADMIN — PHENYLEPHRINE HYDROCHLORIDE 100 MCG: 10 INJECTION INTRAVENOUS at 13:23

## 2018-04-16 RX ADMIN — GLYCOPYRROLATE 0.4 MG: 0.2 INJECTION, SOLUTION INTRAMUSCULAR; INTRAVENOUS at 14:10

## 2018-04-16 RX ADMIN — Medication 3 MG: at 14:10

## 2018-04-16 RX ADMIN — EPHEDRINE SULFATE 10 MG: 50 INJECTION INTRAVENOUS at 13:22

## 2018-04-16 RX ADMIN — PROPOFOL 110 MG: 10 INJECTION, EMULSION INTRAVENOUS at 13:06

## 2018-04-16 RX ADMIN — PHENYLEPHRINE HYDROCHLORIDE 100 MCG: 10 INJECTION INTRAVENOUS at 13:18

## 2018-04-16 RX ADMIN — SODIUM CHLORIDE 1000 ML: 9 INJECTION, SOLUTION INTRAVENOUS at 11:04

## 2018-04-16 RX ADMIN — LIDOCAINE HYDROCHLORIDE 100 MG: 20 INJECTION, SOLUTION INFILTRATION; PERINEURAL at 13:06

## 2018-04-16 RX ADMIN — DEXAMETHASONE SODIUM PHOSPHATE 4 MG: 4 INJECTION, SOLUTION INTRAMUSCULAR; INTRAVENOUS at 13:50

## 2018-04-16 RX ADMIN — ONDANSETRON 4 MG: 2 INJECTION INTRAMUSCULAR; INTRAVENOUS at 14:00

## 2018-04-16 NOTE — ANESTHESIA PROCEDURE NOTES
Airway  Date/Time: 4/16/2018 1:11 PM  End Time:4/16/2018 1:11 PM  Airway not difficult    General Information and Staff    Patient location during procedure: OR  CRNA: CATHY MALIN    Indications and Patient Condition  Indications for airway management: airway protection    Preoxygenated: yes  MILS maintained throughout  Mask difficulty assessment: 2 - vent by mask + OA or adjuvant +/- NMBA    Final Airway Details  Final airway type: endotracheal airway      Successful airway: ETT  Cuffed: yes   Successful intubation technique: direct laryngoscopy  Facilitating devices/methods: intubating stylet  Endotracheal tube insertion site: oral  Blade: Dutch  Blade size: #3  ETT size: 7.5 mm  Cormack-Lehane Classification: grade IIa - partial view of glottis  Placement verified by: chest auscultation and capnometry   Cuff volume (mL): 21  Measured from: lips  Number of attempts at approach: 1

## 2018-04-16 NOTE — BRIEF OP NOTE
SKIN GRAFT FULL THICKNESS HEAD / NECK  Progress Note    Jake Santos  4/16/2018    Pre-op Diagnosis:   Basosquamous carcinoma of skin [C44.99]       Post-Op Diagnosis Codes:     * Basosquamous carcinoma of skin [C44.99]    Procedure/CPT® Codes:      Procedure(s):  EXCISION OF NASAL LESION WITH FULL THICKNESS SKIN GRAFT    Surgeon(s):  Fish Cartwright MD    Anesthesia: General    Staff:   Circulator: Mariana Walls RN  Scrub Person: Nancy Singer  Assistant: Jasmine Mac    Estimated Blood Loss: minimal    Urine Voided: * No values recorded between 4/16/2018 12:59 PM and 4/16/2018  2:16 PM *    Specimens:                ID Type Source Tests Collected by Time   A : LEFT NASAL TIP, STITCH @ 12 O'CLOCK Tissue Nose TISSUE PATHOLOGY EXAM Fish Cartwright MD 4/16/2018 1331         Drains:      Findings: Scarring from previous biopsy site excised widely.  No residual tumor noted on frozen section.    Complications: None      Fish Cartwright MD     Date: 4/16/2018  Time: 2:16 PM

## 2018-04-16 NOTE — ANESTHESIA PREPROCEDURE EVALUATION
Anesthesia Evaluation     no history of anesthetic complications:  NPO Solid Status: > 8 hours  NPO Liquid Status: > 2 hours           Airway   Mallampati: III  TM distance: >3 FB  Neck ROM: full  Possible difficult intubation  Dental    (+) lower dentures and upper dentures    Pulmonary - negative pulmonary ROS and normal exam    breath sounds clear to auscultation  (-) not a smoker  Cardiovascular - normal exam  Exercise tolerance: poor (<4 METS)    ECG reviewed  PT is on anticoagulation therapy  Patient on routine beta blocker and Beta blocker given within 24 hours of surgery  Rhythm: regular  Rate: normal    (+) hypertension, CAD, CABG > 6 Months, angina with exertion, PVD, hyperlipidemia,   (-) murmur    ROS comment: Normal sinus rhythm  Nonspecific ST and T wave abnormality  Abnormal ECG  When compared with ECG of 25-MAY-2017 18:38,  No significant change was found  Confirmed by AKRAM    Neuro/Psych- negative ROS  GI/Hepatic/Renal/Endo    (+) obesity,   diabetes mellitus (glu 131) type 2,     Musculoskeletal (-) negative ROS    Abdominal   (+) obese,    Substance History   (+) alcohol use (glass of wine occ),      OB/GYN          Other      history of cancer (skin) active                    Anesthesia Plan    ASA 3     general     Anesthetic plan and risks discussed with patient.

## 2018-04-16 NOTE — H&P (VIEW-ONLY)
Subjective   Jake Santos is a 73 y.o. male.   This is a consultation from Dr. 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  Also states he had a skin cancer resected with a skin graft applied to his nose some time after that (he cannot recall when it was done and thinks it was done in [place]).  Had a lesion of his nasal tip that had been present for a few months.  Underwent shave biopsy by Dr. Fish Bennett in October 2017.  This showed basal squamous carcinoma.  Was initially referred to Dr. Abdirizak Benavidez in [place], but apparently surgery was not done due to the fact that he had recently had a cardiac stent placed and could not be taken off his anticoagulants.  Patient then requested a referral to another center.  He is now 13 months post stent and will be able to go off his Effient if needed.      The following portions of the patient's history were reviewed and updated as appropriate: allergies, current medications, past family history, past medical history, past social history, past surgical history and problem list.      Jake Santos reports that he has quit smoking. He has never used smokeless tobacco. He reports that he drinks alcohol. He reports that he does not use drugs.  Patient is not a tobacco user and has not been counseled for use of tobacco products    No family history on file.    Allergies   Allergen Reactions   • Clopidogrel Other (See Comments)     Confusion         Current Outpatient Prescriptions:   •  amLODIPine (NORVASC) 5 MG tablet, Take 5 mg by mouth Daily., Disp: , Rfl:   •  aspirin 81 MG EC tablet, Take 81 mg by mouth Daily., Disp: , Rfl:   •  atorvastatin (LIPITOR) 80 MG tablet, Take 80 mg by mouth Daily., Disp: , Rfl:   •  cilostazol (PLETAL) 100 MG tablet, Take 100 mg by mouth 2 (Two) Times a Day., Disp: , Rfl:   •  glipiZIDE (GLUCOTROL) 5 MG tablet, Take 5 mg by  mouth 2 (Two) Times a Day Before Meals., Disp: , Rfl:   •  glyBURIDE micronized (GLYNASE) 3 MG tablet, Take 3 mg by mouth 3 (Three) Times a Day With Meals., Disp: , Rfl:   •  HYDROcodone-acetaminophen (NORCO)  MG per tablet, Take 1 tablet by mouth Every 6 (Six) Hours., Disp: , Rfl:   •  isosorbide mononitrate (IMDUR) 120 MG 24 hr tablet, Take 1 tablet by mouth Daily., Disp: 30 tablet, Rfl: 3  •  metFORMIN (GLUCOPHAGE) 1000 MG tablet, Take 1,000 mg by mouth 2 (Two) Times a Day., Disp: , Rfl:   •  metoprolol succinate XL (TOPROL-XL) 25 MG 24 hr tablet, Take 1 tablet by mouth Daily., Disp: 30 tablet, Rfl: 3  •  nitroglycerin (NITROSTAT) 0.4 MG SL tablet, Place 0.4 mg under the tongue Every 5 (Five) Minutes As Needed for Chest Pain. Take no more than 3 doses in 15 minutes., Disp: , Rfl:   •  Omega-3 Fatty Acids (FISH OIL PO), Take 1 capsule by mouth., Disp: , Rfl:   •  prasugrel (EFFIENT) 10 MG tablet, Take 10 mg by mouth., Disp: , Rfl:   •  ramipril (ALTACE) 10 MG capsule, Take 10 mg by mouth Daily., Disp: , Rfl:   •  levoFLOXacin (LEVAQUIN) 500 MG tablet, Take 1 tablet by mouth Daily., Disp: 5 tablet, Rfl: 0  •  ranolazine (RANEXA) 1000 MG 12 hr tablet, Take 1 tablet by mouth Every 12 (Twelve) Hours., Disp: 60 tablet, Rfl: 3  •  UNKNOWN TO PATIENT, Pt is on home meds, but doesn't know what they are. Pharmacy closed., Disp: , Rfl:     Past Medical History:   Diagnosis Date   • Diabetes    • Skin cancer          Review of Systems   Respiratory: Negative for shortness of breath.    Cardiovascular: Negative for chest pain.   All other systems reviewed and are negative.          Objective   Physical Exam  General: Well-developed well-nourished male in no acute distress.  Alert and oriented ×3. Head: Normocephalic. Face: Symmetrical strength and appearance. PERRL. EOMI. Voice:Strong. Speech:Fluent  Ears: External ears no deformity, canals no discharge, tympanic membranes intact clear and mobile bilaterally.  Nose:  Nares show no discharge mass polyp or purulence.  Boggy mucosa is present.  No gross external deformity.  Septum: Midline  Oral cavity: Lips and gums without lesions.  Tongue and floor of mouth without lesions.  Parotid and submandibular ducts unobstructed.  No mucosal lesions on the buccal mucosa or vestibule of the mouth.  Pharynx: No erythema exudate mass or ulcer.  Mirror exam is negative  Neck: No lymphadenopathy.  No thyromegaly.  Trachea and larynx midline.  No masses in the parotid or submandibular glands.  Skin: 0.9 x 0.7 cm sclerotic lesion of the left nasal tip consistent with the biopsy site indicated in Dr. Bennett's notes which are personally reviewed.  Chest: Clear.  Heart: Regular.  Abdomen: Benign.    Assessment/Plan   Jake was seen today for skin lesion.    Diagnoses and all orders for this visit:    Basosquamous carcinoma of skin      Plan:I have offered to perform excision of the skin lesion(s) with frozen section margin control if indicated, and possible flap or skin graft if needed for closure.  I explained that in all likelihood a full-thickness skin graft will be needed, as I would not want to perform a flap procedure given that he is previously had both surgery and radiation to his nose and the vasculature is uncertain.  I have explained the nature of this procedure to the patient in layman's terms including risks of bleeding, infection, poor healing, numbness, recurrence, and possible need for further treatment depending on final pathology.  Proposed benefit would be definitive treatment of the identified lesions.  The alternative would be observation which could result in continued or recurrent growth of the lesions.  Patient voices understanding of all of the above and wishes to proceed.  This will be scheduled.  Per [facility] guidelines the patient's outside pathology slides will be obtained for review by in-house pathology prior to the procedure.    My thanks  to Dr. Bennett for this consultation

## 2018-04-16 NOTE — ANESTHESIA POSTPROCEDURE EVALUATION
Patient: Jake Santos    Procedure Summary     Date:  04/16/18 Room / Location:  [facility] OR 08 / [facility] OR    Anesthesia Start:  1300 Anesthesia Stop:  1419    Procedure:  EXCISION OF NASAL LESION WITH FULL THICKNESS SKIN GRAFT (Left ) Diagnosis:       Basosquamous carcinoma of skin      (Basosquamous carcinoma of skin [C44.99])    Surgeon:  Fish Cartwright MD Provider:  Sekou Hawley MD    Anesthesia Type:  general ASA Status:  3          Anesthesia Type: general  Last vitals  BP   139/70 (04/16/18 1100)   Temp   97.5 °F (36.4 °C) (04/16/18 1100)   Pulse   67 (04/16/18 1100)   Resp   18 (04/16/18 1100)     SpO2   96 % (04/16/18 1100)     Post Anesthesia Care and Evaluation    Patient location during evaluation: PACU  Patient participation: complete - patient participated  Level of consciousness: awake  Pain score: 0  Pain management: adequate  Airway patency: patent  Anesthetic complications: No anesthetic complications  PONV Status: none  Cardiovascular status: acceptable  Respiratory status: acceptable  Hydration status: acceptable

## 2018-04-16 NOTE — OP NOTE
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     SURGEON:  Fish Cartwright MD     ANESTHESIA:  General endotracheal.     ESTIMATED BLOOD LOSS:  Minimal.     FLUIDS:   Crystalloid.    SPECIMENS:  Lesion of left nasal tip, frozen section diagnosis previous biopsy site identified with no residual tumor.      COMPLICATIONS:  None.          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  Patient is here for definitive excision.  He has already had a flap reconstruction on his nose previously so a skin graft reconstruction was planned.       DESCRIPTION OF PROCEDURE:  The patient was taken to the operating room and placed in the supine position.  After the satisfactory induction of general endotracheal anesthesia, the skin of the nose was cleansed with alcohol and a somewhat circular incision was designed around the previous biopsy site.  This was infiltrated with 1% Xylocaine with epinephrine.  The nose and left neck were then prepped and draped sterilely.  The lesion was excised sharply, full-thickness, marked with a suture for orientation and sent to pathology for frozen section.  Frozen section pathology revealed biopsy site identified with no residual carcinoma noted.  The wound was irrigated copiously with saline to which Kefzol had been added.  The wound was measured and was found to be approximately 2 cm x 1.5 cm.  An appropriate sized skin paddle was designed on the left neck, infiltrated with 1% Xylocaine with epinephrine, incised sharply and removed full-thickness and placed in saline.  The neck wound  was irrigated thoroughly with saline to which Kefzol had been added.  Subcutaneous tissues were closed with interrupted 4-0 Vicryl.  The skin was closed with running 4-0 nylon.  The skin graft was then manually thinned with scissors and transferred to the defect which was once again irrigated with saline.  Hemostasis had been obtained using fine bipolar cautery.  The graft was placed in the defect, trimmed to fit, and secured in place with multiple 4-0 Vicryl sutures around the periphery which were left long along with 5-0 Vicryl quilting sutures within the mid portion of the graft.  A bolster dressing was fashioned out of Adaptic, Xeroform gauze, and bacitracin ointment.  This was placed over the skin graft and was then secured in place by tying the peripheral sutures down over the bolster.  At this point, the procedure was terminated.  Bacitracin ointment was applied to the left neck wound and the patient was taken to the recovery room in satisfactory condition.

## 2018-04-16 NOTE — INTERVAL H&P NOTE
H&P updated. The patient was examined and the following changes are noted:  Review by in house pathology reports biopsy was consistent with squamous cell carcinoma arising in a squamous cell carcinoma in situ. Will proceed as planned

## 2018-04-17 LAB
LAB AP CASE REPORT: NORMAL
Lab: NORMAL
Lab: NORMAL
PATH REPORT.FINAL DX SPEC: NORMAL
PATH REPORT.GROSS SPEC: NORMAL

## 2018-04-23 ENCOUNTER — OFFICE VISIT (OUTPATIENT)
Dept: OTOLARYNGOLOGY | Facility: CLINIC | Age: 73
End: 2018-04-23

## 2018-04-23 VITALS — BODY MASS INDEX: 28.49 KG/M2 | HEART RATE: 86 BPM | OXYGEN SATURATION: 97 % | HEIGHT: 68 IN | WEIGHT: 188 LBS

## 2018-04-23 DIAGNOSIS — Z48.817 AFTERCARE FOLLOWING SURGERY OF THE SKIN OR SUBCUTANEOUS TISSUE: Primary | ICD-10-CM

## 2018-04-23 PROCEDURE — 99024 POSTOP FOLLOW-UP VISIT: CPT | Performed by: OTOLARYNGOLOGY

## 2018-04-23 NOTE — PROGRESS NOTES
Subjective   Jake Santos is a 73 y.o. male.       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  Final pathology showed evidence of previous biopsy but no residual cancer.  Due to the fact that he already had a flap reconstruction in this area this was closed with a full-thickness skin graft.  He reports his left neck at the donor site is more bothersome than the nose.    The following portions of the patient's history were reviewed and updated as appropriate: allergies, current medications, past family history, past medical history, past social history, past surgical history and problem list.     reports that he has quit smoking. He has never used smokeless tobacco. He reports that he drinks alcohol. He reports that he does not use drugs.   Patient is not a tobacco user and has not been counseled for use of tobacco products      Review of Systems        Objective   Physical Exam  Neck incision intact, no evidence of infection.  All external sutures are removed  Bolster dressing of the nose is removed by trimming the peripheral sutures.  The central quilting sutures were also removed.  The graft appears to have 100% take with no evidence of hematoma.  A small area of nonviable skin that overlapped the wound edge was sharply debrided.  Bacitracin ointment was applied.      Assessment/Plan   Jake was seen today for post-op.    Diagnoses and all orders for this visit:    Aftercare following surgery of the skin or subcutaneous tissue      Plan: Advised patient to a blab bacitracin ointment to the skin graft twice a day.  Return in 2 weeks, call sooner for problems.

## 2018-05-24 ENCOUNTER — OFFICE VISIT (OUTPATIENT)
Dept: OTOLARYNGOLOGY | Facility: CLINIC | Age: 73
End: 2018-05-24

## 2018-05-24 VITALS — OXYGEN SATURATION: 96 % | WEIGHT: 189.6 LBS | HEIGHT: 68 IN | BODY MASS INDEX: 28.73 KG/M2

## 2018-05-24 DIAGNOSIS — Z48.817 AFTERCARE FOLLOWING SURGERY OF THE SKIN OR SUBCUTANEOUS TISSUE: Primary | ICD-10-CM

## 2018-05-24 PROCEDURE — 99024 POSTOP FOLLOW-UP VISIT: CPT | Performed by: OTOLARYNGOLOGY

## 2019-04-30 ENCOUNTER — DOCUMENTATION (OUTPATIENT)
Dept: CARDIAC REHAB | Facility: HOSPITAL | Age: 74
End: 2019-04-30

## 2019-04-30 NOTE — PROGRESS NOTES
Cardiac Rehab Evaluation: Decline. States he is having SOA and CP.and debating going to ER. Instructed to go to ER

## 2019-07-18 ENCOUNTER — APPOINTMENT (OUTPATIENT)
Dept: GENERAL RADIOLOGY | Facility: HOSPITAL | Age: 74
End: 2019-07-18

## 2019-07-18 ENCOUNTER — HOSPITAL ENCOUNTER (EMERGENCY)
Facility: HOSPITAL | Age: 74
End: 2019-07-18

## 2019-07-18 ENCOUNTER — HOSPITAL ENCOUNTER (EMERGENCY)
Facility: HOSPITAL | Age: 74
Discharge: HOME | End: 2019-07-18
Attending: EMERGENCY MEDICINE | Admitting: EMERGENCY MEDICINE

## 2019-07-18 VITALS
HEIGHT: 66 IN | SYSTOLIC BLOOD PRESSURE: 141 MMHG | TEMPERATURE: 97.7 F | BODY MASS INDEX: 28.35 KG/M2 | HEART RATE: 74 BPM | DIASTOLIC BLOOD PRESSURE: 75 MMHG | RESPIRATION RATE: 18 BRPM | WEIGHT: 176.4 LBS | OXYGEN SATURATION: 95 %

## 2019-07-18 DIAGNOSIS — T18.9XXA SWALLOWED FOREIGN BODY, INITIAL ENCOUNTER: Primary | ICD-10-CM

## 2019-07-18 PROCEDURE — 99283 EMERGENCY DEPT VISIT LOW MDM: CPT

## 2019-07-18 PROCEDURE — 74022 RADEX COMPL AQT ABD SERIES: CPT

## 2019-07-18 RX ORDER — POLYETHYLENE GLYCOL 3350 17 G/17G
17 POWDER, FOR SOLUTION ORAL DAILY
Qty: 116 G | Refills: 0 | Status: SHIPPED | OUTPATIENT
Start: 2019-07-18 | End: 2021-05-09

## 2019-12-09 ENCOUNTER — TRANSCRIBE ORDERS (OUTPATIENT)
Dept: ADMINISTRATIVE | Facility: HOSPITAL | Age: 74
End: 2019-12-09

## 2019-12-09 DIAGNOSIS — I73.9 PAD (PERIPHERAL ARTERY DISEASE) (HCC): Primary | ICD-10-CM

## 2019-12-25 ENCOUNTER — HOSPITAL ENCOUNTER (EMERGENCY)
Facility: HOSPITAL | Age: 74
Discharge: HOME | End: 2019-12-25
Admitting: EMERGENCY MEDICINE

## 2019-12-25 ENCOUNTER — APPOINTMENT (OUTPATIENT)
Dept: GENERAL RADIOLOGY | Facility: HOSPITAL | Age: 74
End: 2019-12-25

## 2019-12-25 VITALS
RESPIRATION RATE: 18 BRPM | TEMPERATURE: 97.9 F | OXYGEN SATURATION: 94 % | HEIGHT: 68 IN | DIASTOLIC BLOOD PRESSURE: 97 MMHG | SYSTOLIC BLOOD PRESSURE: 178 MMHG | BODY MASS INDEX: 27.28 KG/M2 | WEIGHT: 180 LBS | HEART RATE: 78 BPM

## 2019-12-25 DIAGNOSIS — R07.9 CHEST PAIN, UNSPECIFIED TYPE: Primary | ICD-10-CM

## 2019-12-25 LAB
ALBUMIN SERPL-MCNC: 4 G/DL (ref 3.5–5.2)
ALBUMIN/GLOB SERPL: 1.3 G/DL
ALP SERPL-CCNC: 81 U/L (ref 39–117)
ALT SERPL W P-5'-P-CCNC: 27 U/L (ref 1–41)
ANION GAP SERPL CALCULATED.3IONS-SCNC: 10 MMOL/L (ref 5–15)
AST SERPL-CCNC: 21 U/L (ref 1–40)
BASOPHILS # BLD AUTO: 0.05 10*3/MM3 (ref 0–0.2)
BASOPHILS NFR BLD AUTO: 1 % (ref 0–1.5)
BILIRUB SERPL-MCNC: 0.3 MG/DL (ref 0.2–1.2)
BUN BLD-MCNC: 14 MG/DL (ref 8–23)
BUN/CREAT SERPL: 16.5 (ref 7–25)
CALCIUM SPEC-SCNC: 9.2 MG/DL (ref 8.6–10.5)
CHLORIDE SERPL-SCNC: 104 MMOL/L (ref 98–107)
CO2 SERPL-SCNC: 26 MMOL/L (ref 22–29)
CREAT BLD-MCNC: 0.85 MG/DL (ref 0.76–1.27)
DEPRECATED RDW RBC AUTO: 42.8 FL (ref 37–54)
EOSINOPHIL # BLD AUTO: 0.18 10*3/MM3 (ref 0–0.4)
EOSINOPHIL NFR BLD AUTO: 3.5 % (ref 0.3–6.2)
ERYTHROCYTE [DISTWIDTH] IN BLOOD BY AUTOMATED COUNT: 14.8 % (ref 12.3–15.4)
GFR SERPL CREATININE-BSD FRML MDRD: 88 ML/MIN/1.73
GLOBULIN UR ELPH-MCNC: 3 GM/DL
GLUCOSE BLD-MCNC: 186 MG/DL (ref 65–99)
HCT VFR BLD AUTO: 38.4 % (ref 37.5–51)
HGB BLD-MCNC: 12.5 G/DL (ref 13–17.7)
IMM GRANULOCYTES # BLD AUTO: 0.02 10*3/MM3 (ref 0–0.05)
IMM GRANULOCYTES NFR BLD AUTO: 0.4 % (ref 0–0.5)
LIPASE SERPL-CCNC: 21 U/L (ref 13–60)
LYMPHOCYTES # BLD AUTO: 1.23 10*3/MM3 (ref 0.7–3.1)
LYMPHOCYTES NFR BLD AUTO: 24.1 % (ref 19.6–45.3)
MCH RBC QN AUTO: 25.9 PG (ref 26.6–33)
MCHC RBC AUTO-ENTMCNC: 32.6 G/DL (ref 31.5–35.7)
MCV RBC AUTO: 79.7 FL (ref 79–97)
MONOCYTES # BLD AUTO: 0.57 10*3/MM3 (ref 0.1–0.9)
MONOCYTES NFR BLD AUTO: 11.2 % (ref 5–12)
NEUTROPHILS # BLD AUTO: 3.05 10*3/MM3 (ref 1.7–7)
NEUTROPHILS NFR BLD AUTO: 59.8 % (ref 42.7–76)
NRBC BLD AUTO-RTO: 0 /100 WBC (ref 0–0.2)
PLATELET # BLD AUTO: 165 10*3/MM3 (ref 140–450)
PMV BLD AUTO: 9.5 FL (ref 6–12)
POTASSIUM BLD-SCNC: 4.5 MMOL/L (ref 3.5–5.2)
PROT SERPL-MCNC: 7 G/DL (ref 6–8.5)
RBC # BLD AUTO: 4.82 10*6/MM3 (ref 4.14–5.8)
SODIUM BLD-SCNC: 140 MMOL/L (ref 136–145)
TROPONIN T SERPL-MCNC: <0.01 NG/ML (ref 0–0.03)
WBC NRBC COR # BLD: 5.1 10*3/MM3 (ref 3.4–10.8)

## 2019-12-25 PROCEDURE — 71046 X-RAY EXAM CHEST 2 VIEWS: CPT

## 2019-12-25 PROCEDURE — 93010 ELECTROCARDIOGRAM REPORT: CPT | Performed by: INTERNAL MEDICINE

## 2019-12-25 PROCEDURE — 99284 EMERGENCY DEPT VISIT MOD MDM: CPT

## 2019-12-25 PROCEDURE — 85025 COMPLETE CBC W/AUTO DIFF WBC: CPT | Performed by: STUDENT IN AN ORGANIZED HEALTH CARE EDUCATION/TRAINING PROGRAM

## 2019-12-25 PROCEDURE — 93005 ELECTROCARDIOGRAM TRACING: CPT

## 2019-12-25 PROCEDURE — 84484 ASSAY OF TROPONIN QUANT: CPT | Performed by: STUDENT IN AN ORGANIZED HEALTH CARE EDUCATION/TRAINING PROGRAM

## 2019-12-25 PROCEDURE — 83690 ASSAY OF LIPASE: CPT | Performed by: STUDENT IN AN ORGANIZED HEALTH CARE EDUCATION/TRAINING PROGRAM

## 2019-12-25 PROCEDURE — 80053 COMPREHEN METABOLIC PANEL: CPT | Performed by: STUDENT IN AN ORGANIZED HEALTH CARE EDUCATION/TRAINING PROGRAM

## 2019-12-25 RX ORDER — ALUMINA, MAGNESIA, AND SIMETHICONE 2400; 2400; 240 MG/30ML; MG/30ML; MG/30ML
15 SUSPENSION ORAL ONCE
Status: COMPLETED | OUTPATIENT
Start: 2019-12-25 | End: 2019-12-25

## 2019-12-25 RX ORDER — LIDOCAINE HYDROCHLORIDE 20 MG/ML
15 SOLUTION OROPHARYNGEAL ONCE
Status: COMPLETED | OUTPATIENT
Start: 2019-12-25 | End: 2019-12-25

## 2019-12-25 RX ORDER — ASPIRIN 81 MG/1
324 TABLET, CHEWABLE ORAL ONCE
Status: DISCONTINUED | OUTPATIENT
Start: 2019-12-25 | End: 2019-12-25 | Stop reason: HOSPADM

## 2019-12-25 RX ADMIN — ALUMINUM HYDROXIDE, MAGNESIUM HYDROXIDE, AND DIMETHICONE 15 ML: 400; 400; 40 SUSPENSION ORAL at 13:20

## 2019-12-25 RX ADMIN — LIDOCAINE HYDROCHLORIDE 15 ML: 20 SOLUTION ORAL; TOPICAL at 13:21

## 2019-12-25 NOTE — ED PROVIDER NOTES
Subjective   74-year-old male past medical history of CABG x2, multiple stents placed, hypertension, diabetes, prior smoker presents to the ER with a 1-1/2-week history of pressure-like chest pain in the morning when he wakes up.  He thinks it resolves a little bit when he takes nitros.  Reports that it radiates to his jaws.  Denies funny taste in his mouth or hoarseness.          Review of Systems   Constitutional: Negative for chills, diaphoresis, fatigue and fever.   HENT: Negative for congestion and rhinorrhea.    Eyes: Negative for discharge and redness.   Respiratory: Negative for cough, chest tightness, shortness of breath and wheezing.    Cardiovascular: Positive for chest pain. Negative for palpitations and leg swelling.   Gastrointestinal: Negative for abdominal pain, constipation, diarrhea, nausea and vomiting.   Genitourinary: Negative for dysuria and flank pain.   Neurological: Negative for dizziness and headaches.   Psychiatric/Behavioral: Negative for agitation and confusion. The patient is not nervous/anxious.        Past Medical History:   Diagnosis Date   • Constipated    • Coronary artery disease     cardiac stent  2017 follows with Dr. Falcon seen approx 1 month ago   • Diabetes (CMS/[facility])    • Full dentures    • Hypertension    • Skin cancer     nasal       Allergies   Allergen Reactions   • Clopidogrel Confusion     Confusion       Past Surgical History:   Procedure Laterality Date   • CHOLECYSTECTOMY     • CORONARY ARTERY BYPASS GRAFT      2005, 2007   • HEMORRHOIDECTOMY     • LEG SURGERY Right     2003   • SKIN FULL THICKNESS GRAFT Left 4/16/2018    Procedure: EXCISION OF NASAL LESION WITH FULL THICKNESS SKIN GRAFT;  Surgeon: iFsh Cartwright MD;  Location: [facility];  Service: ENT       History reviewed. No pertinent family history.    Social History     Socioeconomic History   • Marital status:      Spouse name: Not on file   • Number of children: Not on file   • Years of  "education: Not on file   • Highest education level: Not on file   Tobacco Use   • Smoking status: Former Smoker     Last attempt to quit: 2001     Years since quittin.9   • Smokeless tobacco: Never Used   Substance and Sexual Activity   • Alcohol use: Yes     Comment: wine   • Drug use: No           Objective    Vitals:    19 1209 19 1301   BP: 154/85 162/86   BP Location: Right arm    Patient Position: Sitting    Pulse: 79 80   Resp: 18    Temp: 97.9 °F (36.6 °C)    TempSrc: Oral    SpO2: 92% 92%   Weight: 81.6 kg (180 lb)    Height: 172.7 cm (68\")      Physical Exam   Constitutional: He is oriented to person, place, and time. He appears well-developed and well-nourished. No distress.   HENT:   Head: Normocephalic and atraumatic.   Eyes: Conjunctivae are normal.   Cardiovascular: Normal rate and regular rhythm.   Pulmonary/Chest: Effort normal and breath sounds normal. No respiratory distress. He exhibits no tenderness.   Abdominal: Soft. Bowel sounds are normal. There is no tenderness.   Neurological: He is alert and oriented to person, place, and time. No cranial nerve deficit.   Skin: Skin is warm and dry. Capillary refill takes less than 2 seconds. He is not diaphoretic.   Psychiatric: He has a normal mood and affect. His behavior is normal.   Nursing note and vitals reviewed.      ECG 12 Lead    Date/Time: 2019 1:57 PM  Performed by: Luis Alberto Adams MD  Authorized by: Emergency, Triage Protocol, MD   Interpreted by physician  Rhythm: sinus rhythm  Rate: normal  QRS axis: normal  ST Segments: ST segments normal  T Waves: T waves normal  Other findings: prolonged QTc interval  Clinical impression: abnormal ECG                 ED Course      Results for orders placed or performed during the hospital encounter of 19   Comprehensive Metabolic Panel   Result Value Ref Range    Glucose 186 (H) 65 - 99 mg/dL    BUN 14 8 - 23 mg/dL    Creatinine 0.85 0.76 - 1.27 mg/dL    Sodium 140 " 136 - 145 mmol/L    Potassium 4.5 3.5 - 5.2 mmol/L    Chloride 104 98 - 107 mmol/L    CO2 26.0 22.0 - 29.0 mmol/L    Calcium 9.2 8.6 - 10.5 mg/dL    Total Protein 7.0 6.0 - 8.5 g/dL    Albumin 4.00 3.50 - 5.20 g/dL    ALT (SGPT) 27 1 - 41 U/L    AST (SGOT) 21 1 - 40 U/L    Alkaline Phosphatase 81 39 - 117 U/L    Total Bilirubin 0.3 0.2 - 1.2 mg/dL    eGFR Non African Amer 88 >60 mL/min/1.73    Globulin 3.0 gm/dL    A/G Ratio 1.3 g/dL    BUN/Creatinine Ratio 16.5 7.0 - 25.0    Anion Gap 10.0 5.0 - 15.0 mmol/L   Troponin   Result Value Ref Range    Troponin T <0.010 0.000 - 0.030 ng/mL   Lipase   Result Value Ref Range    Lipase 21 13 - 60 U/L   CBC Auto Differential   Result Value Ref Range    WBC 5.10 3.40 - 10.80 10*3/mm3    RBC 4.82 4.14 - 5.80 10*6/mm3    Hemoglobin 12.5 (L) 13.0 - 17.7 g/dL    Hematocrit 38.4 37.5 - 51.0 %    MCV 79.7 79.0 - 97.0 fL    MCH 25.9 (L) 26.6 - 33.0 pg    MCHC 32.6 31.5 - 35.7 g/dL    RDW 14.8 12.3 - 15.4 %    RDW-SD 42.8 37.0 - 54.0 fl    MPV 9.5 6.0 - 12.0 fL    Platelets 165 140 - 450 10*3/mm3    Neutrophil % 59.8 42.7 - 76.0 %    Lymphocyte % 24.1 19.6 - 45.3 %    Monocyte % 11.2 5.0 - 12.0 %    Eosinophil % 3.5 0.3 - 6.2 %    Basophil % 1.0 0.0 - 1.5 %    Immature Grans % 0.4 0.0 - 0.5 %    Neutrophils, Absolute 3.05 1.70 - 7.00 10*3/mm3    Lymphocytes, Absolute 1.23 0.70 - 3.10 10*3/mm3    Monocytes, Absolute 0.57 0.10 - 0.90 10*3/mm3    Eosinophils, Absolute 0.18 0.00 - 0.40 10*3/mm3    Basophils, Absolute 0.05 0.00 - 0.20 10*3/mm3    Immature Grans, Absolute 0.02 0.00 - 0.05 10*3/mm3    nRBC 0.0 0.0 - 0.2 /100 WBC     XR Chest PA & Lateral   Final Result   CONCLUSION:   Chronic obstructive pulmonary disease.   Linear scarring lung bases.   Coronary artery bypass.   Minimal cardiomegaly.      [id number]      Electronically signed by:  Aydin Osorio MD  12/25/2019 1:24 PM   CST Workstation: [id number]                                             HEART Score (for prediction of 6-week risk  of major adverse cardiac event) reviewed and/or performed as part of the patient evaluation and treatment planning process.  The result associated with this review/performance is: 4       MDM  Number of Diagnoses or Management Options  Chest pain, unspecified type: new and requires workup  Diagnosis management comments: Patient placed in bed 4 and evaluated by me. Vital signs are stable, afebrile.  Physical exam is unremarkable.  Labs obtained and are normal.  Troponin negative x1.  Chest x-ray showed no acute cardiopulmonary processes.  EKG shows normal sinus rhythm.  Patient received aspirin and a GI cocktail in the ER.  On reevaluation, patient is alert and resting comfortably. I discussed the results of the emergency department evaluation with the patient.  I recommended primary care follow-up.  Return precautions discussed.       Amount and/or Complexity of Data Reviewed  Clinical lab tests: ordered and reviewed  Tests in the radiology section of CPT®: ordered and reviewed  Tests in the medicine section of CPT®: ordered and reviewed  Decide to obtain previous medical records or to obtain history from someone other than the patient: yes  Review and summarize past medical records: yes  Discuss the patient with other providers: yes  Independent visualization of images, tracings, or specimens: yes    Patient Progress  Patient progress: improved      Final diagnoses:   Chest pain, unspecified type            Luis Alberto Adams MD  Resident  12/25/19 0952

## 2019-12-25 NOTE — ED NOTES
ED tech at bedside for bloodwork     Reema Fofana, RN  12/25/19 1322       Reema Fofana, RN  12/25/19 1327

## 2020-01-20 ENCOUNTER — HOSPITAL ENCOUNTER (OUTPATIENT)
Dept: CARDIOLOGY | Facility: HOSPITAL | Age: 75
Discharge: HOME | End: 2020-01-20
Admitting: SURGERY

## 2020-01-20 DIAGNOSIS — I73.9 PAD (PERIPHERAL ARTERY DISEASE) (HCC): ICD-10-CM

## 2020-01-20 LAB
BH CV LOWER ARTERIAL LEFT ABI RATIO: 0.81
BH CV LOWER ARTERIAL LEFT DORSALIS PEDIS SYS MAX: 99 MMHG
BH CV LOWER ARTERIAL LEFT GREAT TOE SYS MAX: 87 MMHG
BH CV LOWER ARTERIAL LEFT POST TIBIAL SYS MAX: 99 MMHG
BH CV LOWER ARTERIAL LEFT TBI RATIO: 0.71
BH CV LOWER ARTERIAL RIGHT ABI RATIO: 0.7
BH CV LOWER ARTERIAL RIGHT DORSALIS PEDIS SYS MAX: 86 MMHG
BH CV LOWER ARTERIAL RIGHT GREAT TOE SYS MAX: 62 MMHG
BH CV LOWER ARTERIAL RIGHT POST TIBIAL SYS MAX: 76 MMHG
BH CV LOWER ARTERIAL RIGHT TBI RATIO: 0.51
UPPER ARTERIAL LEFT ARM BRACHIAL SYS MAX: 115 MMHG
UPPER ARTERIAL RIGHT ARM BRACHIAL SYS MAX: 122 MMHG

## 2020-01-20 PROCEDURE — 93923 UPR/LXTR ART STDY 3+ LVLS: CPT

## 2020-02-05 ENCOUNTER — DOCUMENTATION (OUTPATIENT)
Dept: CARDIAC REHAB | Facility: HOSPITAL | Age: 75
End: 2020-02-05

## 2020-02-05 NOTE — PROGRESS NOTES
Cardiac Rehab Evaluation: Consult from Dr. Candelaria at [facility] for angina. Having frequent CP and does not meet criteria for stable angina or any other criteria for CR enrollment.

## 2021-05-06 ENCOUNTER — OFFICE VISIT (OUTPATIENT)
Dept: OTOLARYNGOLOGY | Facility: CLINIC | Age: 76
End: 2021-05-06

## 2021-05-06 VITALS — WEIGHT: 189.6 LBS | HEIGHT: 68 IN | OXYGEN SATURATION: 95 % | TEMPERATURE: 98.4 F | BODY MASS INDEX: 28.73 KG/M2

## 2021-05-06 DIAGNOSIS — D48.5 NEOPLASM OF UNCERTAIN BEHAVIOR OF SKIN OF NOSE: Primary | ICD-10-CM

## 2021-05-06 DIAGNOSIS — R26.89 IMBALANCE: ICD-10-CM

## 2021-05-06 PROCEDURE — 99204 OFFICE O/P NEW MOD 45 MIN: CPT | Performed by: OTOLARYNGOLOGY

## 2021-05-06 RX ORDER — CLOPIDOGREL BISULFATE 75 MG/1
75 TABLET ORAL DAILY
COMMUNITY

## 2021-05-06 RX ORDER — GLYBURIDE 3 MG/1
3 TABLET ORAL
COMMUNITY

## 2021-05-06 RX ORDER — LOSARTAN POTASSIUM 25 MG/1
25 TABLET ORAL DAILY
COMMUNITY

## 2021-05-06 RX ORDER — RANOLAZINE 500 MG/1
500 TABLET, EXTENDED RELEASE ORAL 2 TIMES DAILY
COMMUNITY

## 2021-05-06 RX ORDER — CARVEDILOL 3.12 MG/1
3.12 TABLET ORAL 2 TIMES DAILY WITH MEALS
COMMUNITY

## 2021-05-10 NOTE — PROGRESS NOTES
Subjective   Jake Santos is a 76 y.o. male.       History of Present Illness   Patient reports he has a lesion on his right nose that has been present for 4 months.  He is on Plavix and the lesion bleeds intermittently.  It has a crust that will come off periodically and he will bleed.  Has a history of previous cutaneous carcinomas including nasal carcinomas that were basal cell and resected previously.  Has had a skin graft to the left nasal tip.  This was performed by me in April 2018.  Additionally reports he is imbalanced.  Primarily when walking.  Is also worse when he closes his eyes in the shower.  No spinning vertigo.  Is fine when he sitting still.  No change in his hearing.      The following portions of the patient's history were reviewed and updated as appropriate: allergies, current medications, past family history, past medical history, past social history, past surgical history and problem list.     reports that he quit smoking about 20 years ago. He has never used smokeless tobacco. He reports current alcohol use. He reports that he does not use drugs.   Patient is not a tobacco user and has not been counseled for use of tobacco products      Review of Systems        Objective   Physical Exam  Ears: External ears no deformity, canals no discharge, tympanic membranes intact clear and mobile bilaterally.  Skin: Well-healed skin graft to the left nasal tip.  The area of concern is a 0.7 x 0.5 mm crusted lesion of the right lateral alar crease.  This is highly suspicious for basal cell carcinoma.      Assessment/Plan   Diagnoses and all orders for this visit:    1. Neoplasm of uncertain behavior of skin of nose (Primary)    2. Imbalance        Plan: Told the patient his imbalance was likely chronic decompensation and due to issues that he has with his legs and feet and not a primary otologic pathology.  Advise using a walking stick and balance precautions at home.  For the skin lesion of the nose,  I have offered to perform excision of the skin lesion with frozen section margin control if indicated, and possible flap or skin graft if needed for closure.  I have explained the nature of this procedure to the patient in layman's terms including risks of bleeding, infection, poor healing, poor appearance, numbness, recurrence, and possible need for further treatment depending on final pathology.  Proposed benefit would be definitive treatment of the identified lesions.  The alternative would be observation which could result in continued or recurrent growth of the lesions.  Patient voices understanding of all of the above and wishes to proceed.  This will be scheduled.  He will stop his Plavix 5 days prior procedure.

## 2021-05-19 ENCOUNTER — PROCEDURE VISIT (OUTPATIENT)
Dept: OTOLARYNGOLOGY | Facility: CLINIC | Age: 76
End: 2021-05-19

## 2021-05-19 VITALS — WEIGHT: 187.4 LBS | OXYGEN SATURATION: 94 % | BODY MASS INDEX: 28.4 KG/M2 | HEIGHT: 68 IN | TEMPERATURE: 97.3 F

## 2021-05-19 DIAGNOSIS — D48.5 NEOPLASM OF UNCERTAIN BEHAVIOR OF SKIN OF NOSE: Primary | ICD-10-CM

## 2021-05-19 PROCEDURE — 14060 TIS TRNFR E/N/E/L 10 SQ CM/<: CPT | Performed by: OTOLARYNGOLOGY

## 2021-05-19 NOTE — PROGRESS NOTES
Procedure note    Preop diagnosis: Neoplasm uncertain behavior skin of the right nose    Postop diagnosis: Basal cell carcinoma right nose    Procedure: Excision of basal cell carcinoma right nose 0.9 cm with bilobed flap closure (less than 10 cm²)    Surgeon: Dr. Cartwright    Anesthesia: 1% Xylocaine with epinephrine    Description of procedure: After explained the nature of the procedure to the patient in layman's terms including risks of bleeding, infection, poor healing, poor appearance, recurrence, and possible need for further treatment depending on final pathology as well as the benefit of definitive diagnosis and hopefully definitive treatment and the alternative of observation informed consent was confirmed.  The skin around the visible lesion was cleansed with alcohol as well as the remainder of the nasal dorsum.  The skin around the lesion was infiltrated with 1% Xylocaine with epinephrine and then prepped and draped sterilely.  The lesion was excised sharply full-thickness, marked with a suture for orientation, and sent to pathology for frozen section.  While awaiting frozen section small bleeders were controlled with high-frequency cautery.  Wound was irrigated thoroughly with saline and a saline wet-to-dry dressing was applied while awaiting frozen section.  Frozen section returned basal cell carcinoma with clear margins.  The defect was too large to close primarily.  It was felt that a laterally based bilobed flap would be the most appropriate closure.  The initial incision was effectively teardrop shaped to accommodate such a flap.  Therefore the flap was designed and the skin was infiltrated with 1% Xylocaine with epinephrine.  The flap was incised elevated wound edges undermined, the entire wound was thoroughly irrigated with saline and the flaps were rotated.  The primary flap was secured using interrupted 5-0 Vicryl.  The secondary flap was trimmed to fit the primary defect and was secured with  interrupted 5-0 Vicryl.  The secondary flap donor site was closed primarily.  Skin was then closed with 5-0 nylon.  Bacitracin ointment was applied.  Procedure was terminated.  Patient tolerated procedure well.  He was instructed in local wound care and advised to return in 1 week.

## 2021-05-21 LAB
LAB AP CASE REPORT: NORMAL
PATH REPORT.FINAL DX SPEC: NORMAL

## 2021-05-26 ENCOUNTER — OFFICE VISIT (OUTPATIENT)
Dept: OTOLARYNGOLOGY | Facility: CLINIC | Age: 76
End: 2021-05-26

## 2021-05-26 VITALS — OXYGEN SATURATION: 96 % | TEMPERATURE: 98.3 F | HEIGHT: 68 IN | WEIGHT: 187 LBS | BODY MASS INDEX: 28.34 KG/M2

## 2021-05-26 DIAGNOSIS — Z48.817 AFTERCARE FOLLOWING SURGERY OF THE SKIN OR SUBCUTANEOUS TISSUE: Primary | ICD-10-CM

## 2021-05-26 PROCEDURE — 99024 POSTOP FOLLOW-UP VISIT: CPT | Performed by: OTOLARYNGOLOGY

## 2021-05-26 NOTE — PROGRESS NOTES
Subjective   Jake Santos is a 76 y.o. male.       History of Present Illness     Patient is 1 week status post excision of a basal cell carcinoma of the right nose.  This was closed with a bilobed flap.  Patient states that he bled quite a bit from the incision the day of surgery but did not notify me.  Says it has been bleeding off and on ever since and he feels a knot inside his nose.    The following portions of the patient's history were reviewed and updated as appropriate: allergies, current medications, past family history, past medical history, past social history, past surgical history and problem list.     reports that he quit smoking about 20 years ago. He has never used smokeless tobacco. He reports current alcohol use. He reports that he does not use drugs.   Patient is not a tobacco user and has not been counseled for use of tobacco products      Review of Systems        Objective   Physical Exam  The operative site shows quite a bit of crusting but no purulence.  There appears to be full-thickness tissue loss of the distal third of the primary flap and about 10% of the secondary flap distally.  The sutures are trimmed and removed and the dead tissue was debrided sharply.  There appears to be a small hematoma beneath the flap that is cleaned with no evidence of purulence.  Bacitracin ointment is applied after cleaning the entire wound with peroxide.      Assessment/Plan   Diagnoses and all orders for this visit:    1. Aftercare following surgery of the skin or subcutaneous tissue (Primary)        Plan: Sutures removed and debridement as described above.  Advised that he continue his local wound care twice a day.  Cover the wound as needed.  Return in 2 weeks for reevaluation, call sooner for problems.

## 2021-06-10 ENCOUNTER — OFFICE VISIT (OUTPATIENT)
Dept: OTOLARYNGOLOGY | Facility: CLINIC | Age: 76
End: 2021-06-10

## 2021-06-10 VITALS
WEIGHT: 186 LBS | OXYGEN SATURATION: 92 % | HEART RATE: 59 BPM | BODY MASS INDEX: 28.19 KG/M2 | TEMPERATURE: 97.6 F | HEIGHT: 68 IN

## 2021-06-10 DIAGNOSIS — Z48.817 AFTERCARE FOLLOWING SURGERY OF THE SKIN OR SUBCUTANEOUS TISSUE: Primary | ICD-10-CM

## 2021-06-10 PROCEDURE — 99024 POSTOP FOLLOW-UP VISIT: CPT | Performed by: OTOLARYNGOLOGY

## 2021-06-10 RX ORDER — SULFAMETHOXAZOLE AND TRIMETHOPRIM 800; 160 MG/1; MG/1
1 TABLET ORAL 2 TIMES DAILY
Qty: 20 TABLET | Refills: 0 | Status: SHIPPED | OUTPATIENT
Start: 2021-06-10

## 2021-06-10 NOTE — PROGRESS NOTES
Subjective   Jake Santos is a 76 y.o. male.       History of Present Illness   Is status post excision of a basal cell carcinoma of the right nose closed with a bilobed flap.  Had some tissue loss of the distal portion of both the primary and the secondary flap.  Returns today stating his main concern is pain inside his nose on the right.  Says every time he blows his nose it hurts.      The following portions of the patient's history were reviewed and updated as appropriate: allergies, current medications, past family history, past medical history, past social history, past surgical history and problem list.     reports that he quit smoking about 20 years ago. His smoking use included cigarettes. He has never used smokeless tobacco. He reports current alcohol use. He reports that he does not use drugs.   Patient is not a tobacco user and has not been counseled for use of tobacco products      Review of Systems        Objective   Physical Exam  Externally the wound appears to be healing well.  Small crust are present at the distal end of each flap.  Beneath the crust of the primary flap there appears to be a subcutaneous space and this is probed but no pus is encountered.  Intranasally the lateral nasal wall beneath the incision site is tender to palpation but there is no obvious fluctuance      Assessment/Plan   Diagnoses and all orders for this visit:    1. Aftercare following surgery of the skin or subcutaneous tissue (Primary)    Other orders  -     sulfamethoxazole-trimethoprim (Bactrim DS) 800-160 MG per tablet; Take 1 tablet by mouth 2 (Two) Times a Day.  Dispense: 20 tablet; Refill: 0  -     mupirocin (Bactroban) 2 % ointment; Apply  topically to the appropriate area as directed 3 (Three) Times a Day.  Dispense: 22 g; Refill: 1      Plan: We will treat for presumed infection even though no purulence is encountered.  Add Bactrim DS 1 p.o. twice daily.  Change wound care to cleaning with peroxide and  applying mupirocin ointment 3 times a day internally and externally.  Recheck 1 week.

## 2021-06-21 ENCOUNTER — OFFICE VISIT (OUTPATIENT)
Dept: OTOLARYNGOLOGY | Facility: CLINIC | Age: 76
End: 2021-06-21

## 2021-06-21 VITALS — WEIGHT: 187.4 LBS | HEIGHT: 68 IN | BODY MASS INDEX: 28.4 KG/M2 | TEMPERATURE: 97.4 F

## 2021-06-21 DIAGNOSIS — G89.18 POSTOPERATIVE PAIN: Primary | ICD-10-CM

## 2021-06-21 DIAGNOSIS — Z48.817 AFTERCARE FOLLOWING SURGERY OF THE SKIN OR SUBCUTANEOUS TISSUE: ICD-10-CM

## 2021-06-21 PROCEDURE — 99024 POSTOP FOLLOW-UP VISIT: CPT | Performed by: OTOLARYNGOLOGY

## 2021-06-21 NOTE — PROGRESS NOTES
"Subjective   Jake Santos is a 76 y.o. male.       History of Present Illness   Patient is status post excision of a basal cell carcinoma of the right nose closed with a bilobed flap.  Had some soft tissue loss of the distal portion of both the primary and secondary flap.  His primary complaint has been that he has a pain inside his nose.  At last visit the wound was probed for retained purulence and none was found.  He has completed a course of Bactrim and topical mupirocin both externally and internally and he states that the outside of his nose is healed but the pain is still present primarily when he blows his nose, which he says he does frequently.      The following portions of the patient's history were reviewed and updated as appropriate: allergies, current medications, past family history, past medical history, past social history, past surgical history and problem list.     reports that he quit smoking about 20 years ago. His smoking use included cigarettes. He has never used smokeless tobacco. He reports current alcohol use. He reports that he does not use drugs.   Patient is not a tobacco user and has not been counseled for use of tobacco products      Review of Systems        Objective   Physical Exam  The external nose is now completely healed with no evidence of fluctuance erythema or purulence intranasally there is also no erythema or purulence.  He is subjectively tender to palpation of the lower lateral cartilage at the point of junction to the upper lateral cartilage      Assessment/Plan   Diagnoses and all orders for this visit:    1. Postoperative pain (Primary)    2. Aftercare following surgery of the skin or subcutaneous tissue      Plan: I think this may be some sort of inflammation of the cartilage of his nose as a result of the healing process.  I discussed having him take ibuprofen but he says he has \"heard bad things about it\" and does not want to take any.  He currently takes an 81 " mg aspirin twice a day.  I suggested he can consider increasing this to a full strength aspirin 325 mg twice daily for 2 weeks and see if this will not help with the painful sensation in his nose.  I have scheduled him an appointment for 3 weeks but told him he may cancel that and follow-up as needed if his symptoms have resolved.  If it remains painful additional diagnostic considerations will be given.

## 2024-02-03 NOTE — PROGRESS NOTES
Subjective   Jake Santos is a 73 y.o. male.       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  This was closed with full-thickness skin graft due to the fact that he had a previous flap in the area.  Final pathology showed no residual tumor but did show the biopsy site.  Patient returns for reevaluation stating that he is not having any specific problems with bleeding or drainage.      The following portions of the patient's history were reviewed and updated as appropriate: allergies, current medications, past family history, past medical history, past social history, past surgical history and problem list.     reports that he has quit smoking. He has never used smokeless tobacco. He reports that he drinks alcohol. He reports that he does not use drugs.   Patient is not a tobacco user and has not been counseled for use of tobacco products      Review of Systems        Objective   Physical Exam  Skin graft is fully healed.  Good contour.  Reasonable color match.  Neck incision is intact.  There is a single strand of Vicryl inferiorly that is grasp and removed with no evidence of granuloma or purulence.      Assessment/Plan   Jake was seen today for follow-up.    Diagnoses and all orders for this visit:    Aftercare following surgery of the skin or subcutaneous tissue      Plan: No further treatment needed from my standpoint.  Patient has regular visits with Dr. Bennett so may follow up with me as needed.       slipping, stumbling. tripping

## (undated) DEVICE — ELECTRD NDL OLSEN MOD TIP W/2MM EXPOSURE 1P/U

## (undated) DEVICE — GLV SURG TRIUMPH LT PF LTX 8 STRL

## (undated) DEVICE — ADAPT CANCER LUER STUB 18G

## (undated) DEVICE — SUT ETHLN 5/0 P3 18IN 698H

## (undated) DEVICE — SPNG LAP 18X18IN LF STRL PK/5

## (undated) DEVICE — SYR LL TP 10ML STRL

## (undated) DEVICE — PENCL E/S HNDSWCH ROCKR CB

## (undated) DEVICE — SUT VIC 5/0 P2 18IN J503G

## (undated) DEVICE — MARKR SKIN W/RULR AND LBL

## (undated) DEVICE — STERILE POLYISOPRENE POWDER-FREE SURGICAL GLOVES WITH EMOLLIENT COATING: Brand: PROTEXIS

## (undated) DEVICE — GOWN ,SIRUS ,NONREINFORCED 4XL: Brand: MEDLINE

## (undated) DEVICE — SOL IRR NACL 0.9PCT BT 1000ML

## (undated) DEVICE — PAD GRND REM POLYHESIVE A/ DISP

## (undated) DEVICE — SUT VIC 4/0 P3 18IN UD VCP494G

## (undated) DEVICE — GLV SURG SENSICARE GREEN W/ALOE PF LF 6.5 STRL

## (undated) DEVICE — GOWN,AURORA,NOREINF,RAGLAN,XL,STERILE: Brand: MEDLINE

## (undated) DEVICE — PK ENT LF 60

## (undated) DEVICE — GLV SURG TRIUMPH ORTHO W/ALOE PF LTX 6.5 STRL

## (undated) DEVICE — DRSNG WND GZ CURAD OIL EMULSION 3X3IN STRL

## (undated) DEVICE — ELECTRD BLD EZ CLN MOD 2.5IN

## (undated) DEVICE — SPNG GZ WOVN 4X4IN 12PLY 10/BX STRL

## (undated) DEVICE — ANTIBACTERIAL UNDYED BRAIDED (POLYGLACTIN 910), SYNTHETIC ABSORBABLE SUTURE: Brand: COATED VICRYL

## (undated) DEVICE — DRSNG GZ CURAD XEROFORM NONADHS 5X9IN STRL

## (undated) DEVICE — NDL HYPO ECLPS SFTY 25G 1 1/2IN

## (undated) DEVICE — SUT ETHLN 4/0 FS2 18IN 662G

## (undated) DEVICE — TRY IRR